# Patient Record
Sex: FEMALE | Race: WHITE | NOT HISPANIC OR LATINO | Employment: OTHER | URBAN - METROPOLITAN AREA
[De-identification: names, ages, dates, MRNs, and addresses within clinical notes are randomized per-mention and may not be internally consistent; named-entity substitution may affect disease eponyms.]

---

## 2021-04-09 LAB — CRC RECOMMENDATION EXT: NORMAL

## 2022-12-06 ENCOUNTER — OFFICE VISIT (OUTPATIENT)
Dept: OBSTETRICS AND GYNECOLOGY | Facility: CLINIC | Age: 67
End: 2022-12-06
Payer: COMMERCIAL

## 2022-12-06 VITALS
DIASTOLIC BLOOD PRESSURE: 62 MMHG | WEIGHT: 193.81 LBS | SYSTOLIC BLOOD PRESSURE: 112 MMHG | BODY MASS INDEX: 32.25 KG/M2

## 2022-12-06 DIAGNOSIS — Z01.419 WELL WOMAN EXAM WITH ROUTINE GYNECOLOGICAL EXAM: Primary | ICD-10-CM

## 2022-12-06 PROCEDURE — 3008F BODY MASS INDEX DOCD: CPT | Mod: CPTII,S$GLB,, | Performed by: OBSTETRICS & GYNECOLOGY

## 2022-12-06 PROCEDURE — 88175 CYTOPATH C/V AUTO FLUID REDO: CPT | Performed by: OBSTETRICS & GYNECOLOGY

## 2022-12-06 PROCEDURE — 87088 URINE BACTERIA CULTURE: CPT | Performed by: OBSTETRICS & GYNECOLOGY

## 2022-12-06 PROCEDURE — 1160F PR REVIEW ALL MEDS BY PRESCRIBER/CLIN PHARMACIST DOCUMENTED: ICD-10-PCS | Mod: CPTII,S$GLB,, | Performed by: OBSTETRICS & GYNECOLOGY

## 2022-12-06 PROCEDURE — 1101F PT FALLS ASSESS-DOCD LE1/YR: CPT | Mod: CPTII,S$GLB,, | Performed by: OBSTETRICS & GYNECOLOGY

## 2022-12-06 PROCEDURE — 3288F PR FALLS RISK ASSESSMENT DOCUMENTED: ICD-10-PCS | Mod: CPTII,S$GLB,, | Performed by: OBSTETRICS & GYNECOLOGY

## 2022-12-06 PROCEDURE — 99387 PR PREVENTIVE VISIT,NEW,65 & OVER: ICD-10-PCS | Mod: S$GLB,,, | Performed by: OBSTETRICS & GYNECOLOGY

## 2022-12-06 PROCEDURE — 3288F FALL RISK ASSESSMENT DOCD: CPT | Mod: CPTII,S$GLB,, | Performed by: OBSTETRICS & GYNECOLOGY

## 2022-12-06 PROCEDURE — 3074F PR MOST RECENT SYSTOLIC BLOOD PRESSURE < 130 MM HG: ICD-10-PCS | Mod: CPTII,S$GLB,, | Performed by: OBSTETRICS & GYNECOLOGY

## 2022-12-06 PROCEDURE — 3078F PR MOST RECENT DIASTOLIC BLOOD PRESSURE < 80 MM HG: ICD-10-PCS | Mod: CPTII,S$GLB,, | Performed by: OBSTETRICS & GYNECOLOGY

## 2022-12-06 PROCEDURE — 1101F PR PT FALLS ASSESS DOC 0-1 FALLS W/OUT INJ PAST YR: ICD-10-PCS | Mod: CPTII,S$GLB,, | Performed by: OBSTETRICS & GYNECOLOGY

## 2022-12-06 PROCEDURE — 3008F PR BODY MASS INDEX (BMI) DOCUMENTED: ICD-10-PCS | Mod: CPTII,S$GLB,, | Performed by: OBSTETRICS & GYNECOLOGY

## 2022-12-06 PROCEDURE — 87086 URINE CULTURE/COLONY COUNT: CPT | Performed by: OBSTETRICS & GYNECOLOGY

## 2022-12-06 PROCEDURE — 3078F DIAST BP <80 MM HG: CPT | Mod: CPTII,S$GLB,, | Performed by: OBSTETRICS & GYNECOLOGY

## 2022-12-06 PROCEDURE — 81001 URINALYSIS AUTO W/SCOPE: CPT | Performed by: OBSTETRICS & GYNECOLOGY

## 2022-12-06 PROCEDURE — 1126F AMNT PAIN NOTED NONE PRSNT: CPT | Mod: CPTII,S$GLB,, | Performed by: OBSTETRICS & GYNECOLOGY

## 2022-12-06 PROCEDURE — 87077 CULTURE AEROBIC IDENTIFY: CPT | Performed by: OBSTETRICS & GYNECOLOGY

## 2022-12-06 PROCEDURE — 3074F SYST BP LT 130 MM HG: CPT | Mod: CPTII,S$GLB,, | Performed by: OBSTETRICS & GYNECOLOGY

## 2022-12-06 PROCEDURE — 99999 PR PBB SHADOW E&M-EST. PATIENT-LVL III: CPT | Mod: PBBFAC,,, | Performed by: OBSTETRICS & GYNECOLOGY

## 2022-12-06 PROCEDURE — 1160F RVW MEDS BY RX/DR IN RCRD: CPT | Mod: CPTII,S$GLB,, | Performed by: OBSTETRICS & GYNECOLOGY

## 2022-12-06 PROCEDURE — 87186 SC STD MICRODIL/AGAR DIL: CPT | Performed by: OBSTETRICS & GYNECOLOGY

## 2022-12-06 PROCEDURE — 4010F ACE/ARB THERAPY RXD/TAKEN: CPT | Mod: CPTII,S$GLB,, | Performed by: OBSTETRICS & GYNECOLOGY

## 2022-12-06 PROCEDURE — 99999 PR PBB SHADOW E&M-EST. PATIENT-LVL III: ICD-10-PCS | Mod: PBBFAC,,, | Performed by: OBSTETRICS & GYNECOLOGY

## 2022-12-06 PROCEDURE — 4010F PR ACE/ARB THEARPY RXD/TAKEN: ICD-10-PCS | Mod: CPTII,S$GLB,, | Performed by: OBSTETRICS & GYNECOLOGY

## 2022-12-06 PROCEDURE — 1159F PR MEDICATION LIST DOCUMENTED IN MEDICAL RECORD: ICD-10-PCS | Mod: CPTII,S$GLB,, | Performed by: OBSTETRICS & GYNECOLOGY

## 2022-12-06 PROCEDURE — 1159F MED LIST DOCD IN RCRD: CPT | Mod: CPTII,S$GLB,, | Performed by: OBSTETRICS & GYNECOLOGY

## 2022-12-06 PROCEDURE — 99387 INIT PM E/M NEW PAT 65+ YRS: CPT | Mod: S$GLB,,, | Performed by: OBSTETRICS & GYNECOLOGY

## 2022-12-06 PROCEDURE — 1126F PR PAIN SEVERITY QUANTIFIED, NO PAIN PRESENT: ICD-10-PCS | Mod: CPTII,S$GLB,, | Performed by: OBSTETRICS & GYNECOLOGY

## 2022-12-06 NOTE — PROGRESS NOTES
HPI:   67 y.o.   OB History          2    Para        Term                AB        Living             SAB        IAB        Ectopic        Multiple        Live Births                  No LMP recorded (lmp unknown). Patient has had an ablation.    Patient is  here for her annual gynecologic exam.  She has no complaints.     ROS:  GENERAL: No fever, chills, fatigability or weight loss.  SKIN: No rashes, itching or changes in color or texture of skin.  HEAD: No headaches or recent head trauma.  EYES: Visual acuity fine. No photophobia, ocular pain or diplopia.  EARS: Denies ear pain, discharge or vertigo.  NOSE: No loss of smell, no epistaxis or postnasal drip.  MOUTH & THROAT: No hoarseness or change in voice. No excessive gum bleeding.  NODES: Denies swollen glands.  CHEST: Denies DIXON, cyanosis, wheezing, cough and sputum production.  CARDIOVASCULAR: Denies chest pain, PND, orthopnea or reduced exercise tolerance.  ABDOMEN: Appetite fine. No weight loss. Denies diarrhea, abdominal pain, hematemesis or blood in stool.  URINARY: No flank pain, dysuria or hematuria.  PERIPHERAL VASCULAR: No claudication or cyanosis.  MUSCULOSKELETAL: No joint stiffness or swelling. Denies back pain.  NEUROLOGIC: No history of seizures, paralysis, alteration of gait or coordination.    PE:   /62   Wt 87.9 kg (193 lb 12.6 oz)   LMP  (LMP Unknown)   BMI 32.25 kg/m²   APPEARANCE: Well nourished, well developed, in no acute distress.  NECK: Neck symmetric without masses or thyromegaly.  BREASTS: Symmetrical, no skin changes or visible lesions. No palpable masses, nipple discharge or adenopathy bilaterally.  ABDOMEN: Flat. Soft. No tenderness or masses. No hepatosplenomegaly. No hernias. No CVA tenderness.  VULVA: No lesions. Normal female genitalia.  URETHRAL MEATUS: Normal size and location, no lesions, no prolapse.  URETHRA: No masses, tenderness, prolapse or scarring.  VAGINA: Moist and well rugated, no discharge,  no significant cystocele or rectocele.  CERVIX: No lesions and discharge. PAP done.  UTERUS: Normal size, regular shape, mobile, non-tender, bladder base nontender.  ADNEXA: No masses, tenderness or CDS nodularity.  ANUS PERINEUM: Normal.    PROCEDURES:  Pap smear    Assessment:  Normal Gynecologic Exam    Plan:  Mammogram and Colonoscopy if indicated by current recommendations.  Return to clinic in one year or for any problems or complaints.  No gyn co, no bleeding  Does have passive urine loss upon rising, not at night, no usi  She looses it, then can go again, good stream,  Will send urine culture, then maybe try  meds trial

## 2022-12-07 LAB
BACTERIA #/AREA URNS AUTO: ABNORMAL /HPF
BILIRUB UR QL STRIP: NEGATIVE
CLARITY UR REFRACT.AUTO: CLEAR
COLOR UR AUTO: YELLOW
GLUCOSE UR QL STRIP: ABNORMAL
HGB UR QL STRIP: NEGATIVE
KETONES UR QL STRIP: NEGATIVE
LEUKOCYTE ESTERASE UR QL STRIP: NEGATIVE
MICROSCOPIC COMMENT: ABNORMAL
NITRITE UR QL STRIP: NEGATIVE
NON-SQ EPI CELLS #/AREA URNS AUTO: <1 /HPF
PH UR STRIP: 5 [PH] (ref 5–8)
PROT UR QL STRIP: NEGATIVE
RBC #/AREA URNS AUTO: 1 /HPF (ref 0–4)
SP GR UR STRIP: 1.03 (ref 1–1.03)
SQUAMOUS #/AREA URNS AUTO: 1 /HPF
URN SPEC COLLECT METH UR: ABNORMAL
WBC #/AREA URNS AUTO: 2 /HPF (ref 0–5)
YEAST UR QL AUTO: ABNORMAL

## 2022-12-09 ENCOUNTER — PATIENT MESSAGE (OUTPATIENT)
Dept: OBSTETRICS AND GYNECOLOGY | Facility: CLINIC | Age: 67
End: 2022-12-09
Payer: COMMERCIAL

## 2022-12-09 LAB — BACTERIA UR CULT: ABNORMAL

## 2022-12-09 RX ORDER — OXYBUTYNIN CHLORIDE 5 MG/1
5 TABLET ORAL 2 TIMES DAILY
Qty: 40 TABLET | Refills: 2 | Status: SHIPPED | OUTPATIENT
Start: 2022-12-09 | End: 2023-02-07

## 2022-12-14 LAB
FINAL PATHOLOGIC DIAGNOSIS: NORMAL
Lab: NORMAL

## 2023-02-28 ENCOUNTER — TELEPHONE (OUTPATIENT)
Dept: OBSTETRICS AND GYNECOLOGY | Facility: CLINIC | Age: 68
End: 2023-02-28
Payer: COMMERCIAL

## 2023-02-28 NOTE — TELEPHONE ENCOUNTER
----- Message from Jaskaran Hernandez sent at 2/28/2023  3:54 PM CST -----  Contact: pt  Type: Requesting to speak with nurse        Who Called: PT  Regarding: would like to speak to Hoda Montgomery MA  Would the patient rather a call back or a response via [facility]? Call back  Best Call Back Number: [phone]  Additional Information:      
----- Message from Krish Chapa sent at 2/28/2023  1:40 PM CST -----  .Type:  Needs Medical Advice    Who Called: pt    Would the patient rather a call back or a response via [facility]? Call back  Best Call Back Number: [phone]  Additional Information:     Pt stated medication for bladder leaks not working and pt saw an advertisement on TV about a new medication and wants to try it to please give her a call back    
Medication pt is currently taking is not helping.  Pt does not know the name of the medication she saw on tv, thinks it starts with an E.  Pt wants to know if there is another medication she can try or does she need to start thinking about bladder surgery  
Name of the medication is gemtesa  
no

## 2023-03-01 ENCOUNTER — PATIENT MESSAGE (OUTPATIENT)
Dept: OBSTETRICS AND GYNECOLOGY | Facility: CLINIC | Age: 68
End: 2023-03-01
Payer: COMMERCIAL

## 2023-03-02 ENCOUNTER — PATIENT MESSAGE (OUTPATIENT)
Dept: OBSTETRICS AND GYNECOLOGY | Facility: CLINIC | Age: 68
End: 2023-03-02
Payer: COMMERCIAL

## 2024-06-18 ENCOUNTER — HOSPITAL ENCOUNTER (EMERGENCY)
Facility: HOSPITAL | Age: 69
Discharge: PSYCHIATRIC OR SUBSTANCE USE FACILITY | End: 2024-06-19
Attending: EMERGENCY MEDICINE
Payer: MEDICARE

## 2024-06-18 DIAGNOSIS — R45.851 SUICIDAL IDEATION: Primary | ICD-10-CM

## 2024-06-18 DIAGNOSIS — Z00.8 MEDICAL CLEARANCE FOR PSYCHIATRIC ADMISSION: ICD-10-CM

## 2024-06-18 DIAGNOSIS — N39.0 URINARY TRACT INFECTION WITHOUT HEMATURIA, SITE UNSPECIFIED: ICD-10-CM

## 2024-06-18 LAB
ALBUMIN SERPL BCP-MCNC: 4.5 G/DL (ref 3.5–5.2)
ALP SERPL-CCNC: 53 U/L (ref 55–135)
ALT SERPL W/O P-5'-P-CCNC: 19 U/L (ref 10–44)
AMPHET+METHAMPHET UR QL: NEGATIVE
ANION GAP SERPL CALC-SCNC: 12 MMOL/L (ref 8–16)
APAP SERPL-MCNC: <3 UG/ML (ref 10–20)
AST SERPL-CCNC: 29 U/L (ref 10–40)
BACTERIA #/AREA URNS AUTO: ABNORMAL /HPF
BARBITURATES UR QL SCN>200 NG/ML: NEGATIVE
BASOPHILS # BLD AUTO: 0.05 K/UL (ref 0–0.2)
BASOPHILS NFR BLD: 0.5 % (ref 0–1.9)
BENZODIAZ UR QL SCN>200 NG/ML: NEGATIVE
BILIRUB SERPL-MCNC: 0.7 MG/DL (ref 0.1–1)
BILIRUB UR QL STRIP: NEGATIVE
BUN SERPL-MCNC: 10 MG/DL (ref 8–23)
BZE UR QL SCN: NEGATIVE
CALCIUM SERPL-MCNC: 10.4 MG/DL (ref 8.7–10.5)
CANNABINOIDS UR QL SCN: NEGATIVE
CHLORIDE SERPL-SCNC: 100 MMOL/L (ref 95–110)
CLARITY UR REFRACT.AUTO: ABNORMAL
CO2 SERPL-SCNC: 26 MMOL/L (ref 23–29)
COLOR UR AUTO: YELLOW
CREAT SERPL-MCNC: 0.9 MG/DL (ref 0.5–1.4)
CREAT UR-MCNC: 151 MG/DL (ref 15–325)
DIFFERENTIAL METHOD BLD: ABNORMAL
EOSINOPHIL # BLD AUTO: 0.6 K/UL (ref 0–0.5)
EOSINOPHIL NFR BLD: 6.3 % (ref 0–8)
ERYTHROCYTE [DISTWIDTH] IN BLOOD BY AUTOMATED COUNT: 14.3 % (ref 11.5–14.5)
EST. GFR  (NO RACE VARIABLE): >60 ML/MIN/1.73 M^2
ETHANOL SERPL-MCNC: <10 MG/DL
GLUCOSE SERPL-MCNC: 182 MG/DL (ref 70–110)
GLUCOSE UR QL STRIP: ABNORMAL
HCT VFR BLD AUTO: 47.4 % (ref 37–48.5)
HCV AB SERPL QL IA: NORMAL
HGB BLD-MCNC: 16.5 G/DL (ref 12–16)
HGB UR QL STRIP: NEGATIVE
HIV 1+2 AB+HIV1 P24 AG SERPL QL IA: NORMAL
HYALINE CASTS UR QL AUTO: 40 /LPF
IMM GRANULOCYTES # BLD AUTO: 0.03 K/UL (ref 0–0.04)
IMM GRANULOCYTES NFR BLD AUTO: 0.3 % (ref 0–0.5)
KETONES UR QL STRIP: NEGATIVE
LEUKOCYTE ESTERASE UR QL STRIP: ABNORMAL
LYMPHOCYTES # BLD AUTO: 3.2 K/UL (ref 1–4.8)
LYMPHOCYTES NFR BLD: 33.5 % (ref 18–48)
MCH RBC QN AUTO: 29.1 PG (ref 27–31)
MCHC RBC AUTO-ENTMCNC: 34.8 G/DL (ref 32–36)
MCV RBC AUTO: 84 FL (ref 82–98)
METHADONE UR QL SCN>300 NG/ML: NEGATIVE
MICROSCOPIC COMMENT: ABNORMAL
MONOCYTES # BLD AUTO: 0.8 K/UL (ref 0.3–1)
MONOCYTES NFR BLD: 8.2 % (ref 4–15)
NEUTROPHILS # BLD AUTO: 4.9 K/UL (ref 1.8–7.7)
NEUTROPHILS NFR BLD: 51.2 % (ref 38–73)
NITRITE UR QL STRIP: NEGATIVE
NRBC BLD-RTO: 0 /100 WBC
OPIATES UR QL SCN: NEGATIVE
PCP UR QL SCN>25 NG/ML: NEGATIVE
PH UR STRIP: 6 [PH] (ref 5–8)
PLATELET # BLD AUTO: 155 K/UL (ref 150–450)
PMV BLD AUTO: 10 FL (ref 9.2–12.9)
POTASSIUM SERPL-SCNC: 3.8 MMOL/L (ref 3.5–5.1)
PROT SERPL-MCNC: 8.7 G/DL (ref 6–8.4)
PROT UR QL STRIP: ABNORMAL
RBC # BLD AUTO: 5.67 M/UL (ref 4–5.4)
RBC #/AREA URNS AUTO: 40 /HPF (ref 0–4)
SODIUM SERPL-SCNC: 138 MMOL/L (ref 136–145)
SP GR UR STRIP: >1.03 (ref 1–1.03)
SQUAMOUS #/AREA URNS AUTO: 1 /HPF
TOXICOLOGY INFORMATION: NORMAL
TSH SERPL DL<=0.005 MIU/L-ACNC: 2.91 UIU/ML (ref 0.4–4)
URN SPEC COLLECT METH UR: ABNORMAL
WBC # BLD AUTO: 9.54 K/UL (ref 3.9–12.7)
WBC #/AREA URNS AUTO: 11 /HPF (ref 0–5)
YEAST UR QL AUTO: ABNORMAL

## 2024-06-18 PROCEDURE — 99285 EMERGENCY DEPT VISIT HI MDM: CPT | Mod: 25

## 2024-06-18 PROCEDURE — 82077 ASSAY SPEC XCP UR&BREATH IA: CPT | Performed by: EMERGENCY MEDICINE

## 2024-06-18 PROCEDURE — 93005 ELECTROCARDIOGRAM TRACING: CPT

## 2024-06-18 PROCEDURE — 87389 HIV-1 AG W/HIV-1&-2 AB AG IA: CPT | Performed by: PHYSICIAN ASSISTANT

## 2024-06-18 PROCEDURE — 80053 COMPREHEN METABOLIC PANEL: CPT | Performed by: EMERGENCY MEDICINE

## 2024-06-18 PROCEDURE — 86803 HEPATITIS C AB TEST: CPT | Performed by: PHYSICIAN ASSISTANT

## 2024-06-18 PROCEDURE — 93010 ELECTROCARDIOGRAM REPORT: CPT | Mod: ,,, | Performed by: INTERNAL MEDICINE

## 2024-06-18 PROCEDURE — 80307 DRUG TEST PRSMV CHEM ANLYZR: CPT | Performed by: EMERGENCY MEDICINE

## 2024-06-18 PROCEDURE — 80143 DRUG ASSAY ACETAMINOPHEN: CPT | Performed by: EMERGENCY MEDICINE

## 2024-06-18 PROCEDURE — 84443 ASSAY THYROID STIM HORMONE: CPT | Performed by: EMERGENCY MEDICINE

## 2024-06-18 PROCEDURE — 87086 URINE CULTURE/COLONY COUNT: CPT | Performed by: EMERGENCY MEDICINE

## 2024-06-18 PROCEDURE — 85025 COMPLETE CBC W/AUTO DIFF WBC: CPT | Performed by: EMERGENCY MEDICINE

## 2024-06-18 PROCEDURE — 81001 URINALYSIS AUTO W/SCOPE: CPT | Mod: XB | Performed by: EMERGENCY MEDICINE

## 2024-06-18 PROCEDURE — 25000003 PHARM REV CODE 250: Performed by: STUDENT IN AN ORGANIZED HEALTH CARE EDUCATION/TRAINING PROGRAM

## 2024-06-18 RX ORDER — GLUCAGON 1 MG
1 KIT INJECTION
Status: DISCONTINUED | OUTPATIENT
Start: 2024-06-18 | End: 2024-06-19 | Stop reason: HOSPADM

## 2024-06-18 RX ORDER — FLUCONAZOLE 200 MG/1
200 TABLET ORAL
Status: COMPLETED | OUTPATIENT
Start: 2024-06-18 | End: 2024-06-18

## 2024-06-18 RX ORDER — CEPHALEXIN 500 MG/1
500 CAPSULE ORAL
Status: COMPLETED | OUTPATIENT
Start: 2024-06-18 | End: 2024-06-18

## 2024-06-18 RX ORDER — INSULIN ASPART 100 [IU]/ML
0-5 INJECTION, SOLUTION INTRAVENOUS; SUBCUTANEOUS
Status: DISCONTINUED | OUTPATIENT
Start: 2024-06-18 | End: 2024-06-19 | Stop reason: HOSPADM

## 2024-06-18 RX ORDER — IBUPROFEN 200 MG
24 TABLET ORAL
Status: DISCONTINUED | OUTPATIENT
Start: 2024-06-18 | End: 2024-06-19 | Stop reason: HOSPADM

## 2024-06-18 RX ORDER — SERTRALINE HYDROCHLORIDE 50 MG/1
100 TABLET, FILM COATED ORAL DAILY
Status: DISCONTINUED | OUTPATIENT
Start: 2024-06-19 | End: 2024-06-19 | Stop reason: HOSPADM

## 2024-06-18 RX ORDER — LOSARTAN POTASSIUM 25 MG/1
25 TABLET ORAL ONCE
Status: COMPLETED | OUTPATIENT
Start: 2024-06-18 | End: 2024-06-18

## 2024-06-18 RX ORDER — IBUPROFEN 200 MG
16 TABLET ORAL
Status: DISCONTINUED | OUTPATIENT
Start: 2024-06-18 | End: 2024-06-19 | Stop reason: HOSPADM

## 2024-06-18 RX ORDER — METOPROLOL SUCCINATE 50 MG/1
50 TABLET, EXTENDED RELEASE ORAL DAILY
Status: DISCONTINUED | OUTPATIENT
Start: 2024-06-19 | End: 2024-06-19 | Stop reason: HOSPADM

## 2024-06-18 RX ORDER — CEPHALEXIN 500 MG/1
500 CAPSULE ORAL EVERY 8 HOURS
Qty: 15 CAPSULE | Refills: 0 | Status: SHIPPED | OUTPATIENT
Start: 2024-06-18 | End: 2024-06-23

## 2024-06-18 RX ORDER — LISINOPRIL 10 MG/1
10 TABLET ORAL
Status: DISCONTINUED | OUTPATIENT
Start: 2024-06-18 | End: 2024-06-18

## 2024-06-18 RX ADMIN — CEPHALEXIN 500 MG: 500 CAPSULE ORAL at 11:06

## 2024-06-18 RX ADMIN — LOSARTAN POTASSIUM 25 MG: 25 TABLET, FILM COATED ORAL at 11:06

## 2024-06-18 RX ADMIN — FLUCONAZOLE 200 MG: 200 TABLET ORAL at 11:06

## 2024-06-19 VITALS
DIASTOLIC BLOOD PRESSURE: 90 MMHG | SYSTOLIC BLOOD PRESSURE: 155 MMHG | BODY MASS INDEX: 30.32 KG/M2 | WEIGHT: 182 LBS | HEART RATE: 92 BPM | RESPIRATION RATE: 18 BRPM | HEIGHT: 65 IN | TEMPERATURE: 98 F | OXYGEN SATURATION: 98 %

## 2024-06-19 LAB
OHS QRS DURATION: 84 MS
OHS QTC CALCULATION: 438 MS
POCT GLUCOSE: 137 MG/DL (ref 70–110)

## 2024-06-19 PROCEDURE — 82962 GLUCOSE BLOOD TEST: CPT

## 2024-06-19 NOTE — ED TRIAGE NOTES
Chloe Dolan, a 68 y.o. female presents to the ED w/ complaint of psych eval. OPC. Reports suicidal ideations with a plan to overdose. Not taking care of herself.     Triage note:  Chief Complaint   Patient presents with    Psychiatric Evaluation     OPC. Reports suicidal ideations with a plan to overdose. Not taking care of herself.      Review of patient's allergies indicates:   Allergen Reactions    Adhesive Itching     Past Medical History:   Diagnosis Date    Diabetes mellitus     Heart disease     Hyperlipidemia     Hypertension

## 2024-06-19 NOTE — ED PROVIDER NOTES
Assumed  care of this patient pending urinalysis.  She  is a 68-year-old female who  recently went through a divorce, and has been threatening to overdose on her medication.  Pec placed by prior physician, Dr. Roper, and psychiatric clearance labs sent.  Labs reviewed, largely within normal limits.  UA with questionable UTI, will treat with Keflex.  Patient is medically cleared for psychiatric placement.     Mo Moore MD  06/18/24 6479

## 2024-06-19 NOTE — ED PROVIDER NOTES
Encounter Date: 6/18/2024       History     Chief Complaint   Patient presents with    Psychiatric Evaluation     OPC. Reports suicidal ideations with a plan to overdose. Not taking care of herself.      68-year-old female with history of coronary artery disease, diabetes, hyperlipidemia, hypertension presents under an OPC for suicidal expression.  The OPC states that she has not been bathing, has been giving money away to an actor, express suicidal intent for overdose.  The patient states that she did tell her daughter that she was going to overdose on her medications.  The patient states that she has enough medications including amitriptyline at home.  However, the patient states that she did not mean it.  She does note that her mother had an overdose.  Her social situation has been challenge.  She has no longer house since she got divorce from her .  She is living with her daughter's mother-in-law.  She states that she has difficulty bathing at times due to her arthritis.  She also gets urinary incontinence.        Review of patient's allergies indicates:   Allergen Reactions    Adhesive Itching     Past Medical History:   Diagnosis Date    Diabetes mellitus     Heart disease     Hyperlipidemia     Hypertension      Past Surgical History:   Procedure Laterality Date    ARTERIAL BYPASS SURGRY      back surgey      CHOLECYSTECTOMY      TOTAL KNEE ARTHROPLASTY      WISDOM TOOTH EXTRACTION       Family History   Problem Relation Name Age of Onset    Breast cancer Cousin       Social History     Tobacco Use    Smoking status: Never   Substance Use Topics    Alcohol use: No    Drug use: No     Review of Systems    Physical Exam     Initial Vitals [06/18/24 1846]   BP Pulse Resp Temp SpO2   (!) 144/70 96 18 98 °F (36.7 °C) 97 %      MAP       --         Physical Exam    Constitutional: She is diaphoretic. She appears distressed.   HENT:   Head: Normocephalic and atraumatic.   Eyes: Pupils are equal, round, and  reactive to light.   Neck:   Normal range of motion.  Cardiovascular:  Normal rate, regular rhythm and normal heart sounds.           Pulmonary/Chest: Breath sounds normal. No respiratory distress. She has no wheezes. She has no rales.   Musculoskeletal:      Cervical back: Normal range of motion.     Neurological: She is alert. She has normal strength.   Right face droop from Bell's palsy which is chronic   Psychiatric:   Patient seems upset and desperate.  She denies being suicidal but then states that she has enough medications to kill herself.         ED Course   Procedures  Labs Reviewed   CBC W/ AUTO DIFFERENTIAL - Abnormal; Notable for the following components:       Result Value    RBC 5.67 (*)     Hemoglobin 16.5 (*)     Eos # 0.6 (*)     All other components within normal limits    Narrative:     Release to patient->Immediate   COMPREHENSIVE METABOLIC PANEL - Abnormal; Notable for the following components:    Glucose 182 (*)     Total Protein 8.7 (*)     Alkaline Phosphatase 53 (*)     All other components within normal limits    Narrative:     Release to patient->Immediate   ACETAMINOPHEN LEVEL - Abnormal; Notable for the following components:    Acetaminophen (Tylenol), Serum <3.0 (*)     All other components within normal limits    Narrative:     Release to patient->Immediate   HIV 1 / 2 ANTIBODY    Narrative:     Release to patient->Immediate   HEPATITIS C ANTIBODY    Narrative:     Release to patient->Immediate   TSH    Narrative:     Release to patient->Immediate   DRUG SCREEN PANEL, URINE EMERGENCY    Narrative:     Specimen Source->Urine   ALCOHOL,MEDICAL (ETHANOL)    Narrative:     Release to patient->Immediate   URINALYSIS, REFLEX TO URINE CULTURE     EKG Readings: (Independently Interpreted)   Normal sinus rhythm without acute ischemic changes       Imaging Results    None          Medications   metoprolol succinate (TOPROL-XL) 24 hr tablet 50 mg (has no administration in time range)    lisinopriL tablet 10 mg (has no administration in time range)   sertraline tablet 100 mg (has no administration in time range)   glucose chewable tablet 16 g (has no administration in time range)   glucose chewable tablet 24 g (has no administration in time range)   glucagon (human recombinant) injection 1 mg (has no administration in time range)   insulin aspart U-100 pen 0-5 Units (has no administration in time range)   dextrose 10% bolus 125 mL 125 mL (has no administration in time range)   dextrose 10% bolus 250 mL 250 mL (has no administration in time range)     Medical Decision Making  I reviewed the OPC.  Patient refused to let me call her daughter.  I am very concerned about this situation.  She express suicidal intent earlier but then now states she was choking.  It seems strange that she brings up several times that she has enough medications at home to kill herself.  I am concerned that her mother had an overdose.  It seems that her social situation is poor and she has had a lot of negative changes in her life recently.  Will continue the OPC and place a pec.    Amount and/or Complexity of Data Reviewed  Labs: ordered.    Risk  OTC drugs.  Prescription drug management.                                      Clinical Impression:  Final diagnoses:  [Z00.8] Medical clearance for psychiatric admission  [R45.527] Suicidal ideation (Primary)                 Woody Roper MD  06/18/24 2157       Woody Roper MD  06/18/24 1849

## 2024-06-19 NOTE — ED NOTES
Mark here to receive pt. [facility], and security. Patient belongings, secure envelope, face sheet, and original transfer form and PEC/OPC sent with patient to facility. Pt left in NAD.

## 2024-06-20 LAB
BACTERIA UR CULT: NORMAL
BACTERIA UR CULT: NORMAL

## 2024-06-29 ENCOUNTER — HOSPITAL ENCOUNTER (EMERGENCY)
Facility: HOSPITAL | Age: 69
Discharge: HOME | End: 2024-06-29
Attending: EMERGENCY MEDICINE
Payer: MEDICARE

## 2024-06-29 VITALS
RESPIRATION RATE: 18 BRPM | TEMPERATURE: 99 F | BODY MASS INDEX: 30.32 KG/M2 | HEIGHT: 65 IN | SYSTOLIC BLOOD PRESSURE: 182 MMHG | WEIGHT: 182 LBS | HEART RATE: 94 BPM | DIASTOLIC BLOOD PRESSURE: 84 MMHG | OXYGEN SATURATION: 97 %

## 2024-06-29 DIAGNOSIS — R60.0 PERIPHERAL EDEMA: ICD-10-CM

## 2024-06-29 DIAGNOSIS — F91.9 BEHAVIOR DISTURBANCE: Primary | ICD-10-CM

## 2024-06-29 LAB
ALBUMIN SERPL BCP-MCNC: 4.2 G/DL (ref 3.5–5.2)
ALP SERPL-CCNC: 52 U/L (ref 55–135)
ALT SERPL W/O P-5'-P-CCNC: 14 U/L (ref 10–44)
AMPHET+METHAMPHET UR QL: NEGATIVE
ANION GAP SERPL CALC-SCNC: 18 MMOL/L (ref 8–16)
APAP SERPL-MCNC: <3 UG/ML (ref 10–20)
AST SERPL-CCNC: 22 U/L (ref 10–40)
BACTERIA #/AREA URNS HPF: NORMAL /HPF
BARBITURATES UR QL SCN>200 NG/ML: NEGATIVE
BASOPHILS # BLD AUTO: 0.02 K/UL (ref 0–0.2)
BASOPHILS NFR BLD: 0.2 % (ref 0–1.9)
BENZODIAZ UR QL SCN>200 NG/ML: NEGATIVE
BILIRUB SERPL-MCNC: 0.7 MG/DL (ref 0.1–1)
BILIRUB UR QL STRIP: NEGATIVE
BNP SERPL-MCNC: 114 PG/ML (ref 0–99)
BUN SERPL-MCNC: 10 MG/DL (ref 8–23)
BZE UR QL SCN: NEGATIVE
CALCIUM SERPL-MCNC: 10.3 MG/DL (ref 8.7–10.5)
CANNABINOIDS UR QL SCN: NEGATIVE
CHLORIDE SERPL-SCNC: 103 MMOL/L (ref 95–110)
CLARITY UR: CLEAR
CO2 SERPL-SCNC: 23 MMOL/L (ref 23–29)
COLOR UR: YELLOW
CREAT SERPL-MCNC: 0.9 MG/DL (ref 0.5–1.4)
CREAT UR-MCNC: 22.9 MG/DL (ref 15–325)
DIFFERENTIAL METHOD BLD: ABNORMAL
EOSINOPHIL # BLD AUTO: 0.5 K/UL (ref 0–0.5)
EOSINOPHIL NFR BLD: 5.2 % (ref 0–8)
ERYTHROCYTE [DISTWIDTH] IN BLOOD BY AUTOMATED COUNT: 14.4 % (ref 11.5–14.5)
EST. GFR  (NO RACE VARIABLE): >60 ML/MIN/1.73 M^2
ETHANOL SERPL-MCNC: <10 MG/DL
GLUCOSE SERPL-MCNC: 138 MG/DL (ref 70–110)
GLUCOSE UR QL STRIP: ABNORMAL
HCT VFR BLD AUTO: 41.9 % (ref 37–48.5)
HGB BLD-MCNC: 14.1 G/DL (ref 12–16)
HGB UR QL STRIP: NEGATIVE
IMM GRANULOCYTES # BLD AUTO: 0.02 K/UL (ref 0–0.04)
IMM GRANULOCYTES NFR BLD AUTO: 0.2 % (ref 0–0.5)
KETONES UR QL STRIP: NEGATIVE
LEUKOCYTE ESTERASE UR QL STRIP: NEGATIVE
LYMPHOCYTES # BLD AUTO: 2.8 K/UL (ref 1–4.8)
LYMPHOCYTES NFR BLD: 28.9 % (ref 18–48)
MCH RBC QN AUTO: 28.8 PG (ref 27–31)
MCHC RBC AUTO-ENTMCNC: 33.7 G/DL (ref 32–36)
MCV RBC AUTO: 86 FL (ref 82–98)
METHADONE UR QL SCN>300 NG/ML: NEGATIVE
MICROSCOPIC COMMENT: NORMAL
MONOCYTES # BLD AUTO: 0.9 K/UL (ref 0.3–1)
MONOCYTES NFR BLD: 9.9 % (ref 4–15)
NEUTROPHILS # BLD AUTO: 5.3 K/UL (ref 1.8–7.7)
NEUTROPHILS NFR BLD: 55.6 % (ref 38–73)
NITRITE UR QL STRIP: NEGATIVE
NRBC BLD-RTO: 0 /100 WBC
OPIATES UR QL SCN: NEGATIVE
PCP UR QL SCN>25 NG/ML: NEGATIVE
PH UR STRIP: 5 [PH] (ref 5–8)
PLATELET # BLD AUTO: 149 K/UL (ref 150–450)
PMV BLD AUTO: 10 FL (ref 9.2–12.9)
POCT GLUCOSE: 125 MG/DL (ref 70–110)
POTASSIUM SERPL-SCNC: 3.5 MMOL/L (ref 3.5–5.1)
PROT SERPL-MCNC: 8.1 G/DL (ref 6–8.4)
PROT UR QL STRIP: NEGATIVE
RBC # BLD AUTO: 4.89 M/UL (ref 4–5.4)
SALICYLATES SERPL-MCNC: <5 MG/DL (ref 15–30)
SODIUM SERPL-SCNC: 144 MMOL/L (ref 136–145)
SP GR UR STRIP: 1.01 (ref 1–1.03)
SQUAMOUS #/AREA URNS HPF: 2 /HPF
TOXICOLOGY INFORMATION: NORMAL
TSH SERPL DL<=0.005 MIU/L-ACNC: 3 UIU/ML (ref 0.4–4)
URN SPEC COLLECT METH UR: ABNORMAL
UROBILINOGEN UR STRIP-ACNC: NEGATIVE EU/DL
WBC # BLD AUTO: 9.54 K/UL (ref 3.9–12.7)
WBC #/AREA URNS HPF: 1 /HPF (ref 0–5)
YEAST URNS QL MICRO: NORMAL

## 2024-06-29 PROCEDURE — 81000 URINALYSIS NONAUTO W/SCOPE: CPT | Performed by: EMERGENCY MEDICINE

## 2024-06-29 PROCEDURE — 80053 COMPREHEN METABOLIC PANEL: CPT | Performed by: EMERGENCY MEDICINE

## 2024-06-29 PROCEDURE — 83880 ASSAY OF NATRIURETIC PEPTIDE: CPT | Performed by: EMERGENCY MEDICINE

## 2024-06-29 PROCEDURE — 80143 DRUG ASSAY ACETAMINOPHEN: CPT | Performed by: EMERGENCY MEDICINE

## 2024-06-29 PROCEDURE — 82077 ASSAY SPEC XCP UR&BREATH IA: CPT | Performed by: EMERGENCY MEDICINE

## 2024-06-29 PROCEDURE — 25000003 PHARM REV CODE 250: Performed by: EMERGENCY MEDICINE

## 2024-06-29 PROCEDURE — 99283 EMERGENCY DEPT VISIT LOW MDM: CPT

## 2024-06-29 PROCEDURE — G0427 INPT/ED TELECONSULT70: HCPCS | Mod: 95,,, | Performed by: PSYCHIATRY & NEUROLOGY

## 2024-06-29 PROCEDURE — 84443 ASSAY THYROID STIM HORMONE: CPT | Performed by: EMERGENCY MEDICINE

## 2024-06-29 PROCEDURE — 80179 DRUG ASSAY SALICYLATE: CPT | Performed by: EMERGENCY MEDICINE

## 2024-06-29 PROCEDURE — 82962 GLUCOSE BLOOD TEST: CPT

## 2024-06-29 PROCEDURE — 80307 DRUG TEST PRSMV CHEM ANLYZR: CPT | Performed by: EMERGENCY MEDICINE

## 2024-06-29 PROCEDURE — 85025 COMPLETE CBC W/AUTO DIFF WBC: CPT | Performed by: EMERGENCY MEDICINE

## 2024-06-29 PROCEDURE — 36415 COLL VENOUS BLD VENIPUNCTURE: CPT | Performed by: EMERGENCY MEDICINE

## 2024-06-29 RX ORDER — FUROSEMIDE 20 MG/1
20 TABLET ORAL
Status: COMPLETED | OUTPATIENT
Start: 2024-06-29 | End: 2024-06-29

## 2024-06-29 RX ADMIN — FUROSEMIDE 20 MG: 20 TABLET ORAL at 08:06

## 2024-06-30 NOTE — ED PROVIDER NOTES
Encounter Date: 6/29/2024       History     Chief Complaint   Patient presents with    Psychiatric Evaluation     Recent discharge from [facility]     Emergent evaluation of a 68-year-old female with history of CAD status post CABG, hypertension, hyperlipidemia, diabetes, CHF on 20 mg of Lasix daily presents to the ER by EMS after discharge from [facility] today.  Patient has been at [facility] for a verbal argument and stating that she could overdose on her meds- but did not have an attempt. She has been depressed over a recent divorce and difficult social situation of no longer having her own home.    At this time she is very linear,  alert awake and oriented. she knows all of her medications, past medical history, physicians and procedures and dates.  She reports while in [facility] they did not have urinary pads that she has to use for urinary incontinence and was having to wear pull-ups.  This caused her to get a rash to the right groin.  That when she came home today she did cleaned the area but she has not yet ap[plied antifungal ointment on it but did placed baby powder in the area.  Since arriving home she also placed her compression stocking on and took diuretic which caused increased urination. she reports that while at the facility she was not given her Lasix timely and developed swelling in the legs as well as dyspnea on exertion when walking down the hallways.  She reports no chest pain or shortness of breath no cough now.  She was states when she got home today she and her granddaughter got into an argument over the possibility that is she is being scammed for money. She feels she knows the man dennise is giving money too but understands that it may be a scam and states she knows she should stopped giving him money..  But this resulted in argument in which she became angry and said to her granddaughter that if she had a stroke or heart attack she would just lay on the floor.  She  reports that she regrets saying this and does not feel suicidal.  She also reports no homicidal ideations.  She wants to go back to her granddaughter's house due to not having anywhere us to stay.  She was unsure whether her granddaughter will allow this        I spoke with Keisha Bahena, pts granddaughter, whom after leaving [facility] she went home with today.  She reports they got into an argument over the fact that Ms. Corona he was being scammed out of money.  The patient reportedly said she if she had  stroke or heart attack symptoms that she was just lay in the floor and did not want to get treatment.  The grand daughter brings this up as possible suicidality, she also reports the patient urinated on herself 3 times today and did not want to immediately clean herself.   And reportedly hid her right groin rash from the psychiatric facility.  She reports she does not feel comfortable taking the patient back to her home due to these above incidents.  She was concerned that she may become suicidal and attempt to hurt herself though she had not  say that she wanted to hurt herself, had a plan or attempt. No homicidal statements were made.   Family had reportedly been in the ER when patient was 1st arrived but within 1 hr 20 minutes left- but were available for phone call.      Review of patient's allergies indicates:   Allergen Reactions    Adhesive Itching     Past Medical History:   Diagnosis Date    Diabetes mellitus     Heart disease     Hyperlipidemia     Hypertension      Past Surgical History:   Procedure Laterality Date    ARTERIAL BYPASS SURGRY      back surgey      CHOLECYSTECTOMY      TOTAL KNEE ARTHROPLASTY      WISDOM TOOTH EXTRACTION       Family History   Problem Relation Name Age of Onset    Breast cancer Cousin       Social History     Tobacco Use    Smoking status: Never   Substance Use Topics    Alcohol use: No    Drug use: No     Review of Systems   Constitutional:  Negative for  activity change, appetite change, chills, diaphoresis, fatigue and fever.   HENT:  Negative for congestion, postnasal drip, rhinorrhea and sore throat.    Respiratory:  Negative for cough, chest tightness, shortness of breath, wheezing and stridor.         Dyspnea on exertion   Cardiovascular:  Positive for leg swelling. Negative for chest pain and palpitations.   Gastrointestinal:  Negative for abdominal distention, abdominal pain, blood in stool, constipation, diarrhea, nausea and vomiting.   Genitourinary:  Positive for frequency. Negative for difficulty urinating, dysuria, flank pain, hematuria and urgency.   Musculoskeletal:  Negative for arthralgias, back pain, myalgias, neck pain and neck stiffness.   Skin:  Positive for rash. Negative for wound.   Neurological:  Negative for dizziness, syncope, weakness, light-headedness and headaches.   Hematological:  Does not bruise/bleed easily.   Psychiatric/Behavioral:  Positive for sleep disturbance. Negative for agitation, behavioral problems, confusion, decreased concentration, dysphoric mood, hallucinations, self-injury and suicidal ideas. The patient is not nervous/anxious and is not hyperactive.    All other systems reviewed and are negative.      Physical Exam     Initial Vitals [06/29/24 1808]   BP Pulse Resp Temp SpO2   (!) 157/77 95 18 98 °F (36.7 °C) 97 %      MAP       --         Physical Exam    Nursing note and vitals reviewed.  Constitutional: She appears well-developed and well-nourished. She is not diaphoretic. No distress.   HENT:   Head: Normocephalic and atraumatic.   Right Ear: External ear normal.   Left Ear: External ear normal.   Nose: Nose normal.   Mouth/Throat: Oropharynx is clear and moist.   Eyes: Conjunctivae and EOM are normal. Pupils are equal, round, and reactive to light.   Neck: Neck supple. No tracheal deviation present.   Normal range of motion.  Cardiovascular:  Normal rate, regular rhythm, normal heart sounds and intact distal  pulses.     Exam reveals no gallop and no friction rub.       No murmur heard.  Pulmonary/Chest: Breath sounds normal. No stridor. No respiratory distress. She has no wheezes. She has no rhonchi. She has no rales. She exhibits no tenderness.   Abdominal: Abdomen is soft. Bowel sounds are normal. She exhibits no distension and no mass. There is no abdominal tenderness. There is no rebound and no guarding.   Musculoskeletal:         General: No edema. Normal range of motion.      Cervical back: Normal range of motion and neck supple.     Neurological: She is alert and oriented to person, place, and time. She has normal strength. No cranial nerve deficit or sensory deficit.   Skin: Skin is warm and dry. No rash noted. No erythema. No pallor.   Psychiatric: She has a normal mood and affect. Her speech is normal and behavior is normal. Judgment and thought content normal. She is not actively hallucinating. Cognition and memory are not impaired. She is attentive.         ED Course   Procedures  Labs Reviewed   CBC W/ AUTO DIFFERENTIAL - Abnormal; Notable for the following components:       Result Value    Platelets 149 (*)     All other components within normal limits   COMPREHENSIVE METABOLIC PANEL - Abnormal; Notable for the following components:    Glucose 138 (*)     Alkaline Phosphatase 52 (*)     Anion Gap 18 (*)     All other components within normal limits   URINALYSIS, REFLEX TO URINE CULTURE - Abnormal; Notable for the following components:    Glucose, UA 4+ (*)     All other components within normal limits    Narrative:     Specimen Source->Urine   ACETAMINOPHEN LEVEL - Abnormal; Notable for the following components:    Acetaminophen (Tylenol), Serum <3.0 (*)     All other components within normal limits   SALICYLATE LEVEL - Abnormal; Notable for the following components:    Salicylate Lvl <5.0 (*)     All other components within normal limits   B-TYPE NATRIURETIC PEPTIDE - Abnormal; Notable for the following  components:     (*)     All other components within normal limits   POCT GLUCOSE - Abnormal; Notable for the following components:    POCT Glucose 125 (*)     All other components within normal limits   TSH   DRUG SCREEN PANEL, URINE EMERGENCY    Narrative:     Specimen Source->Urine   ALCOHOL,MEDICAL (ETHANOL)   URINALYSIS MICROSCOPIC    Narrative:     Specimen Source->Urine          Imaging Results    None          Medications   furosemide tablet 20 mg (20 mg Oral Given 6/29/24 2003)     Medical Decision Making  Emergent evaluation of a 68-year-old female with history of CAD status post CABG, hypertension, hyperlipidemia, diabetes, CHF on 20 mg of Lasix daily presents to the ER by EMS after discharge from [facility] today.  Patient had been admitted there on June 18th due to verbal threats that she might overdose.  She was discharged today and got into an argument with the granddaughter and again had verbal threats that if she had heart attack or stroke symptoms she was laid on the floor.  She had no suicidal ideations or homicidal ideations.  That has no physical altercation.  Granddaughter also was concerned that she had not immediately care if she had urinated on herself though the patient reports she was taking Lasix which caused her urinary frequency.  And the granddaughter was concerned that she was not treated the rash to her groin and possibly hit it from psychiatric staff.  On my exam patient was blood pressure is 147/77 pulse 95 temp 98° respirations 18 sats 97%.  She was awake alert oriented x3 has no signs of acute psychosis suicidality or homicidal ideation she was not delusional or hallucinating.  She states that she becomes upset and states things that she understands that she shouldn't.  Based on talking to her and on her exam it does appear that the right groin rash has been cleaned.  And has baby powder in place.  She was compression stockings on the lower extremities.  She was  normal cardiac and lung exam with clear breath sounds.  It appears that she has taken steps to manage her peripheral edema and the groin rash.      I do not feel that patient was gravely disabled at this time suicidal or homicidal.  Due to difficult social situation and no clear living arrangements if she was discharged from the ER I will have psychiatrist speak with the patient, and family to determine best course of action patient was under pec in till evaluated by psychiatry.  Patient was agreeable with the evaluation with lab work including adding BNP for possible volume overload and taking 20 mg a Lasix here to help with peripheral edema.  [facility]    Patient presents for emergent evaluation of acute evaluation for psychiatric placement that poses a threat to life and/or bodily function.   Differential diagnosis includes but was not limited to angry outburst, behavior disturbance, acute psychosis, depression, suicidal ideations, CHF, peripheral edema.   In the ED patient found to have acute behavior disturbance now resolved, peripheral edema   I ordered labs and personally reviewed them.  Labs significant for see below  I ordered X-rays and personally reviewed them and reviewed the radiologist interpretation.  Xray significant for see below.    I ordered EKG and personally reviewed it.  EKG significant for see above     [facility]     Patient was managed in the ED with Lasix 20 mg orally   The response to treatment was good.  I spoke with , psych both before and after her evaluation he reports that they have worked out between the granddaughter and the patient that she can go home with them but she was going to turn over her phone to them in order to the she was not continue sitting money to an unknown person, and dog granddaughter will distribute medicines.  She will be discharged home.   Pec rescinded   Rosalia Brooks MD      Amount and/or Complexity of Data Reviewed  Independent Historian:  caregiver  External Data Reviewed: notes.  Labs: ordered.    Risk  Prescription drug management.               ED Course as of 06/29/24 2233   Sat Jun 29, 2024 2112 I spoke with psychiatrist, he was now interviewing the patient and her granddaughter who is at the hospital.  Rosalia Brooks M.D.  9:13 PM 6/29/2024   [RM]   2230 Bnp 114  [RM]      ED Course User Index  [RM] Rosalia Brooks MD                           Clinical Impression:  Final diagnoses:  [F91.9] Behavior disturbance (Primary)  [R60.0] Peripheral edema          ED Disposition Condition    Discharge Stable          ED Prescriptions    None       Follow-up Information    None          Rosalia Brooks MD  06/29/24 2233

## 2024-06-30 NOTE — ED NOTES
Patient is resting in bed without any needs or questions at this time. Patient is calm and cooperative. Risk sitter is in the doorway with direct observation.      No
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
## 2024-06-30 NOTE — ED NOTES
Pt taken to room via EMS, sitter at bedside assisting pt into appropriate gown. Security wanding pt, belongings removed and inventoried. Pt's phone given to her daughter.

## 2024-06-30 NOTE — PATIENT INSTRUCTIONS
Thank you for allowing me to participate as part of your health care team, and thank you for choosing [facility].    LARS MILLER MD  Board Certified in Psychiatry & Addiction Medicine      IN CASE OF SUICIDAL THINKING, call the National Suicide Hotline Number: 988    988 Suicide & Crisis Lifeline: 988 , 0-094-328-TALK (8255)  [email or web address]://[email or web address]           AFTER VISIT INSTRUCTIONS:     [x] Take all medication, from all providers, as prescribed.  [x] If questions or concerns arise, or if experiencing side effects, adverse reactions or worsening symptoms, contact your provider through the [facility] portal at [email or web address], or call [phone] to reach the [facility] main line.  [x] In cases of emergencies, call 221 or 841, or present directly to the emergency department for immediate assistance.      INFORMATION ON MENTAL HEALTH MEDICATIONS:     National [place] of Mental Health:   https://www.nimh.nih.gov/health/topics/mental-health-medications     Web MD:   https://www.[employer]NEMS       RESOURCES:     IN CASE OF SUICIDAL THINKING, call the [facility] Suicide Hotline Number: 988    988 Suicide & Crisis Lifeline: 988 , 6-739-745-TALK (8255)  Provides 24/7, free and confidential support for people in distress, prevention and crisis resources for you or your loved ones, and best practices for professionals.    Call, text or chat.  https://[employer].org     National Action [place] for Suicide Prevention: the National Action [place] for Suicide Prevention (Action [place]) is the nations public-private partnership for suicide prevention, working with more than 250 national partners.   https://theactionalliance.org     National Strategy for Suicide Prevention & Risk Mitigation:  https://theactionalliance.org/our-strategy/national-strategy-suicide-prevention     [x] Fact Sheet:   https://www.hhs.gov/sites/default/files/national-strategy-for-suicide-prevention-factsheet.pdf     [x] Report:    https://www.ncbi.nlm.nih.gov/books/HBK315469/pdf/Bookshelf_NBK109917.pdf     Suicide Prevention Resource Center: The Suicide Prevention Resource Center (SPR) is the only federally supported resource center devoted to advancing the implementation of the National Strategy for Suicide Prevention. [facility] is funded by the U.S. Department of Health and Human Services' Substance Abuse and Mental Health Services Administration (SAMA).  https://www.[facility].org     [x] Safety Plan:   https://Ridango/wp-content/uploads/2021/08/Tari-Safety-Plan-8-6-21.pdf     [x] Suicide Risk Curve:  https://Ridango/wp-content/uploads/2021/08/Fidxqna-eyay-jnese-8-6-21.pdf     [place] Mental Health Advocacy Service: the state agency tasked with protecting the legal rights of people with behavioral health diagnoses.  https://mhas.[place].[facility]     Alcoholics Anonymous (AA): find a meeting near you.  https://www.aa.org     SMI Adviser: resources for individuals and families with serious mental illness.  https://smiadviser.org     National [place] for the Mentally Ill (MARGO): the nation's largest grassroots organization dedicated to building better lives for individuals with mental illness.  https://www.margo.org/Home     U.S. Department of Health and Human Services (HHS): the mission of HHS is to enhance the health and well-being of all Americans, by providing for effective health and human services and by fostering sound, sustained advances in the sciences underlying medicine, public health, and .   https://www.hhs.gov     Substance Abuse and Mental Health Services Administration (SAMHSA): SAMHSA is the agency within [facility] that leads public health efforts to advance the behavioral health of the nation. SAMHSA's mission is to reduce the impact of substance abuse and mental illness on [place]'s communities.   https://www.samhsa.gov     National Institutes of Health (NIH): a part of [facility], [facility] is  the largest biomedical research agency in the world.   https://www.nih.gov     National [place] on Drug Abuse (SILVINA): sponsored by the NIH, the mission of SILVINA is to advance science on drug use and addiction and to apply that knowledge to improve individual and public health.  https://silvina.nih.gov     National [place] on Alcohol Abuse and Alcoholism (NIAAA): sponsored by the NIH, the mission of NIAA is to generate and disseminate fundamental knowledge about the effects of alcohol on health and well-being, and apply that knowledge to improve diagnosis, prevention, and treatment of alcohol-related problems, including alcohol use disorder, across the lifespan.   https://www.niaaa.nih.gov     National Harm Reduction Coalition: resources for harm reduction, including techniques, strategies, policy, and advocacy.  https://harmreduction.org     The SHARE Approach - A Model for Shared Decision Making:  [x] Fact Sheet  https://www.[facility].gov/sites/default/files/publications/files/share-approach_factsheet.pdf     AMA Principles of Medical Ethics - Informed Consent & Shared Decision Making:  [x] Chapter  https://www.ama-assn.org/system/files/2019-06/code-of-medical-ktutsr-mfqwccl-5.pdf     Safety Netting for Primary Care:  [x] Article  https://www.ncbi.nlm.nih.gov/pmc/articles/DMW8332800/pdf/zvxizia-4725--e70.pdf       MEDICATION MANAGEMENT:     [x] In addition to the potential beneficial effects, the use of any medication or drug (prescribed, over the counter or otherwise) carries with it the risk of potential adverse effects.  Each has a set of typical adverse effects - some common, some rare - but idiosyncratic and unanticipated reactions unique to you are always possible.      [x] It is important to remember that untreated illness can also pose a risk, which must be taken into account when weighing the pros and cons of a medication trial.    [x] Medications and drugs can sometimes interact with each other in the  body, leading to adverse effects - it is important that all your providers know all the medications and drugs you take - prescribed, over the counter, or otherwise.  Keep all your practitioners up to date with any changes.  It's always a good idea to keep an up-to-date list in an easily accessible location.    [x] There is an inherent unpredictability to all treatment, including the use of medication.  Unexpected outcomes can occur - keep me up to date with any difficulties you encounter.    [x] It is important to take medication as directed, and to comply fully with the instructions.  Check with the appropriate provider first before adjusting or stopping your medication on your own.    If you require further information pertaining to the issues outlined above, please reach out to your providers through the [facility] portal at [email or web address], or call [phone] to discuss.  See resource list for additional material.     Additional information can be provided pertaining to your diagnosis, intended outcomes, target symptoms for treatment, and possible benefits and risks of medication - you can also access this information through the provided resources.  Possible alternatives to the current treatment plan (including no treatment) can also be reviewed.      GENERAL HEALTH & WELLNESS:     [x] Establish and follow regularly with a primary care physician for routine health maintenance and management of any medical comorbidities.  [x] Follow a healthy diet, exercise routinely, and monitor weight and metabolic parameters.  [x] Allow adequate time for sleep and practice good sleep hygiene.  [x] Do not operate a motor vehicle or heavy machinery if the effects of medications or the symptoms underlying your condition impair the ability for you to do so safely.    Dietary Guidelines for Americans, 6769-1866:  U.S. Department of Agriculture  (USDA)  https://www.dietaryguidelines.gov/sites/default/files/2020-12/Dietary_Guidelines_for_Americans_2020-2025.pdf#page=31     The Nutrition Source:  [facility] of [facility] Health  https://www.[facility].[place].[facility]/nutritionsource       SLEEP HYGIENE:     Follow these tips to establish healthy sleep habits:  [x] Keep a consistent sleep schedule. Get up at the same time every day, even on weekends or during vacations.  [x] Set a bedtime that is early enough for you to get at least 7-8 hours of sleep.  [x] Don't go to bed unless you are sleepy.  [x] If you don't fall asleep after 20 minutes, get out of bed. Go do a quiet activity without a lot of light exposure. It is especially important to not get on electronics.  [x] Establish a relaxing bedtime routine.  [x] Use your bed only for sleep and sex.  [x] Make your bedroom quiet and relaxing. Keep the room at a comfortable, cool temperature.  [x] Limit exposure to bright light in the evenings.  [x] Turn off electronic devices at least 30 minutes before bedtime.  [x] Don't eat a large meal before bedtime. If you are hungry at night, eat a light, healthy snack.  [x] Exercise regularly and maintain a healthy diet.  [x] Avoid consuming caffeine in the afternoon or evening.  [x] Avoid consuming alcohol before bedtime.  [x] Reduce your fluid intake before bedtime.    QUICK TIPS FOR BETTER SLEEP  Reduce smartphone usage Create and maintain a nightly ritual Avoid caffeine 4-6 hours before sleeping Don't eat or drink too much at bedtime Sleep at the same time every night        American Academy of Sleep Medicine - Healthy Sleep Habits:  https://sleepeducation.org/healthy-sleep/healthy-sleep-habits     American Academy of Sleep Medicine - Bedtime Calculator:  https://sleepeducation.org/healthy-sleep/bedtime-calculator     American Academy of Sleep Medicine - Cognitive Behavioral Therapy for Insomnia (CBT-I):  https://sleepeducation.org/patients/cognitive-behavioral-therapy      American Academy of Sleep Medicine - Insomnia:  https://sleepeducation.org/sleep-disorders/insomnia       ALCOHOL & DRUG USE COUNSELING:     Preventing Excessive Alcohol Use (CDC):  https://www.cdc.gov/alcohol/fact-sheets/moderate-drinking.htm#:~:text=To%20reduce%20the%20risk%20of,days%20when%20alcohol%20is%20consumed.     [x] Alcohol consumption is associated with a variety of short- and long-term health risks, including motor vehicle crashes, violence, sexual risk behaviors, high blood pressure, and various cancers (e.g., breast cancer).  [x] The risk of these harms increases with the amount of alcohol you drink. For some conditions, like some cancers, the risk increases even at very low levels of alcohol consumption (less than 1 drink).  [x] To reduce the risk of alcohol-related harms, the 7773-4942 Dietary Guidelines for Americans recommends that adults of legal drinking age can choose not to drink, or to drink in moderation by limiting intake to 2 drinks or less in a day for men or 1 drink or less in a day for women, on days when alcohol is consumed.  [x] The Guidelines also do not recommend that individuals who do not drink alcohol start drinking for any reason and that if adults of legal drinking age choose to drink alcoholic beverages, drinking less is better for health than drinking more.  [x] The Guidelines note that some people should not drink alcohol at all, such as:  - If they are pregnant or might be pregnant.  - If they are younger than age 21.  - If they have certain medical conditions or are taking certain medications that can interact with alcohol.  - If they are recovering from an alcohol use disorder or if they are unable to control the amount they drink.  [x] The Guidelines also note that not drinking alcohol is the safest option for women who are lactating. Generally, moderate consumption of alcoholic beverages by a woman who is lactating (up to 1 standard drink in a day) is not known to  "be harmful to the infant, especially if the woman waits at least 2 hours after a single drink before nursing or expressing breast milk. Women considering consuming alcohol during lactation should talk to their healthcare provider.  [x] The Guidelines note, Emerging evidence suggests that even drinking within the recommended limits may increase the overall risk of death from various causes, such as from several types of cancer and some forms of cardiovascular disease. Alcohol has been found to increase risk for cancer, and for some types of cancer, the risk increases even at low levels of alcohol consumption (less than 1 drink in a day).  [x] Although past studies have indicated that moderate alcohol consumption has protective health benefits (e.g., reducing risk of heart disease), recent studies show this may not be true.  [x] Its important to focus on the amount people drink on the days that they drink. Even if women consume an average of 1 drink per day or men consume an average of 2 drinks per day, binge drinking increases the risk of experiencing alcohol-related harm in the short-term and in the future.    Drinking Levels Defined (NIAAA):  https://www.niaaa.nih.gov/alcohol-health/overview-alcohol-consumption/moderate-binge-drinking     Drinking in Moderation:  According to the "Dietary Guidelines for Americans 6950-5753, U.S. Department of Health and Human Services and U.S. Department of Agriculture, adults of legal drinking age can choose not to drink or to drink in moderation by limiting intake to 2 drinks or less in a day for men and 1 drink or less in a day for women, when alcohol is consumed. Drinking less is better for health than drinking more.    Binge Drinking:  NIAAA defines binge drinking as a pattern of drinking alcohol that brings blood alcohol concentration (FOSTER) to 0.08 percent - or 0.08 grams of alcohol per deciliter - or higher.  For a typical adult, this pattern corresponds to consuming 5 " or more drinks (male), or 4 or more drinks (female), in about 2 hours.    The Substance Abuse and Mental Health Services Administration (SAMHSA), which conducts the annual National Survey on Drug Use and Health (NSDUH), defines binge drinking as 5 or more alcoholic drinks for males or 4 or more alcoholic drinks for females on the same occasion (i.e., at the same time or within a couple of hours of each other) on at least 1 day in the past month.    Heavy Alcohol Use:  NIAAA defines heavy drinking as follows:  - For men, consuming more than 4 drinks on any day or more than 14 drinks per week.  - For women, consuming more than 3 drinks on any day or more than 7 drinks per week.     [facility] defines heavy alcohol use as binge drinking on 5 or more days in the past month.    Patterns of Drinking Associated with Alcohol Use Disorder:  Binge drinking and heavy alcohol use can increase an individual's risk of alcohol use disorder.    Certain people should avoid alcohol completely, including those who:  - Plan to drive or operate machinery, or participate in activities that require skill, coordination, and alertness.  - Take certain over-the-counter or prescription medications.  - Have certain medical conditions.  - Are recovering from alcohol use disorder or are unable to control the amount that they drink.  - Are younger than age 21.  - Are pregnant or may become pregnant.    U.S. Standard Drink  12 oz beer   (5% ABV) 8 oz malt liquor   (7% ABV) 5 oz wine   (12% ABV) 1.5 oz 80-proof distilled spirit  (40% ABV)        Heroin use harm reduction:  1. Carry naloxone. When using heroin, make sure you have at least one dose of naloxone - the overdose reversal drug - and have it in plain view. Understand how to give it.  2. Try a small dose first. It is best to first try a small amount of the heroin to check the effect.  3. Dont use heroin alone. Always use heroin with someone else and take turns while using.    It is possible to  overdose with heroin whether you are snorting, injecting or using it in another form.    Signs of an overdose or emergency:   - The person is awake but unable to talk.  - Their body is limp.  - Their breathing is shallow or slow or stopped.  - Their skin is pale, ashen or clammy/sweaty.  - They are unconscious.    In case of emergency, give naloxone. If you suspect the heroin may contain fentanyl, administer more than one dose. Seek medical help even if naloxone has been given. Call 911 for help.      ADHD TREATMENT AND STIMULANT MEDICATIONS:     [facility] Prescription Stimulants Drug Facts  CMS Stimulant and Related Medications: Use in Adults  [facility] Drug Fact Sheets: Stimulants  FDA Drug Safety Communication: Stimulants  [facility] ADHD  WebMD ADHD Medications and Side Effects  [facility]: ADHD Medication      SHARED DECISION MAKING & INFORMED CONSENT:     Shared medical decision making and informed consent are the hallmark and bedrock of excellent clinical care.  During the encounter, shared medical decision making was employed and informed consent was obtained, to the degree possible, whenever feasible, appropriate and relevant. Those interventions are supplemented here with written materials, detailing the topics in more depth.       PSYCHOEDUCATION:     Psychoeducation pertaining to the following -     Diagnosis Etiology Disease Processes Natural Progression   Treatment Options Time Course Safety Netting Informed Consent   Intended Benefits of Medication Expectable Adverse Effects Target Symptoms for Treatment Alternatives to Current Treatment   Shared   Decision Making Risk Mitigation Strategies Harm Reduction Techniques Associated Bio-Med Complications     - can be further discussed and reviewed (you can also access additional information through the provided resources in this document).      Effective communication is essential in order to engage in shared medical decision making.  If you had difficulty understanding  anything during your encounter or in this supplementary document, please contact your providers through the [facility] portal at [email or web address] or call [phone].     Marie Dictionary  https://dictionary.marie.org/us       It can be easy to miss, forget, or misremember important important information that was discussed during the session - especially when you're stressed, upset, or don't feel well.  If you or a representative have any additional questions, concerns, or topics to discuss - please contact your providers through the [facility] portal at [email or web address] or call [phone].    Memory Loss  https://www.[employer]Gains.Echobit/brain/memory-loss    Causes of Memory Loss  https://www.[employer]/what-causes-memory-loss-6059874    Memory loss: When to seek help  https://www.[facility].org/diseases-conditions/alzheimers-disease/in-depth/memory-loss/art-94196493    Memory, Forgetfulness, and Aging: What's Normal and What's Not?  https://www.chay.nih.gov/health/memory-forgetfulness-and-aging-whats-normal-and-whats-not    Depression and Memory Loss  https://www.[employer]/health/depression/depression-and-memory-loss    The Relationship Between Anxiety and Memory Loss  https://www.[facility]/academics/blog-posts/the-relationship-between-anxiety-and-memory-loss     PRESCRIPTION DRUG MANAGEMENT:     Prescription Drug Management entails the following:  [x] The review, recommendation, or consideration without recommendation of medications during the encounter.  [x] Discussion (to the extent possible) with the patient and/or other interested parties of the diagnosis, target symptoms, intended outcomes, and possible benefits and risks of medication, as well as alternatives (including no treatment), if not otherwise known or stated prior.  [x] Discussion (to the extent possible) with the patient and/or other interested parties of possible expectable adverse effects of any proposed individual  psychotropic agents, as well as the inherent unpredictability of treatment, if not otherwise known or stated prior.  [x] Informed consent is sought from the patient (and/or guardian/designated decision maker, if applicable) after a thorough discussion (to the extent possible) of the aforementioned points outlined above.  [x] The provision of counseling (to the extent possible) to the patient and/or other interested parties on the importance of full compliance with any prescribed medication, if not otherwise known or stated prior.    Information on psychotropic medication can be found at:   National [place] of Mental Health: Information on Mental Health Medications      RISK MITIGATION, HARM REDUCTION & SAFETY NETTING:     Risk Mitigation Strategies, Harm Reduction Techniques, and Safety Netting are important interventions that can reduce acute and chronic risk.  As such, opportunities were sought to incorporate psychoeducation and practical advice pertaining to these topics into the encounter, to the degree possible, whenever feasible, appropriate and relevant.  Those interventions are supplemented here with written materials, detailing the topics in more depth.       RISK MITIGATION STRATEGIES:     Risk mitigation strategies are used to reduce the likelihood of future episodes of suicide, homicide, violence, and/or other problematic behaviors (e.g. self-injurious, risky, addictive, compulsive, impulsive). The following are examples of risk mitigation strategies which you can employ in order to reduce your overall burden of risk.     [x] Treatment of underlying psychopathology driving acute and chronic risk to the extent possible.  [x] Use of self administered rating scales and journaling to assist in risk tracking.  [x] Exploration of protective factors to potentially counterbalance risk.  [x] Identification and avoidance of triggers and situations that increase risk, including excessive alcohol and drug  use.  [x] Timely follow up and ongoing treatment of mental health issues moving forward.  [x] Full compliance with medication regimen.  [x] A good working knowledge of your medication regimen, including specific instructions on the administration of the medications.  [x] Consultation with an appropriate medical provider prior to altering or deviating from these instructions on your own.  [x] Active involvement and participation of family and natural support wherever feasible and possible.  [x] Development and review of coping strategies that can be immediately deployed in times of acute crisis.  [x] Implementation of home safety practices and the removal/reduction of access to lethal means (including, but not limited to, firearms, certain types and quantities of medication, poisons, or other methods you may have contemplated or identified).  [x] Collaborative development of a written safety plan with your treatment team and loved ones that can be immediately referred to in times of acute crisis.  [x] Utilization of a safety contract to engage your treatment team and further assess/manage risk.  [x] A good working knowledge of how to access emergency treatment in times of acute crisis.  [x] Utilization of suicide hotlines number (988) and resources in times of crisis.    If you require further information pertaining to the issues outlined above, please reach out to your providers through the [facility] portal at [email or web address], or call [phone] to discuss.  See resource list for additional material.      SAFETY NETTING:     In healthcare, safety netting refers to the provision of information to help patients or carers identify the need to consult a health care professional if a health concern arises or changes.  The relevance of this advice is most obvious with chronic mental illnesses, as their dynamic nature, with symptoms and signs emerging at different times and in different combinations, makes safety  netting particularly important.  Specific safety net advice for you includes the following:    [x] The existence of uncertainty. Mental health diagnoses and conditions contain at least some degree of uncertainty - knowing this, you should feel empowered to reconsult if necessary.  [x] What exactly to look out for. Given the recognised risk of possible deterioration or the development of complications, you should become familiar with the specific clinical features (including red flags) to look out for.    [x] How exactly to seek further help. You should know how and where to seek further help if needed.  Make a plan in advance and keep it handy.  It's also a good idea to share the plan with your treatment providers and loved ones.  [x] What to expect about time course. Mental health diagnoses and conditions often have an expected time course, which is important information for you to know.  However, if your difficulties do not conform to this time line and concerns arise, do not delay seeking further medical advice.    If you require further information pertaining to the issues outlined above, please reach out to your providers through the [facility] portal at [email or web address], or call [phone] to discuss.  See resource list for additional material.      HARM REDUCTION:     Harm Reduction techniques are used in an effort to reduce negative consequences associated with risky and maladaptive behaviors, until cessation of the problematic behaviors can be established.  Harm reduction is best thought of as a journey and not a destination; it is not an endorsement of problematic behavior, but an acknowledgement and recognition of the step-by-step nature of recovery.      Although commonly employed in working with people who suffer with drug addiction, harm reduction can be more broadly applied to any problematic behavior.    Harm Reduction and Substance Abuse:  [x] Incorporates a spectrum of strategies that  includes safer use, managed use, abstinence, meeting people who use drugs where theyre at, and addressing conditions of use along with the use itself.  [x] Accepts, for better or worse, that licit and illicit drug use is part of our world and chooses to work to minimize its harmful effects rather than simply ignore or condemn them.  [x] Understands drug use as a complex, multi-faceted phenomenon that encompasses a continuum of behaviors from severe use to total abstinence, and acknowledges that some ways of using drugs are clearly safer than others.  [x] Calls for the non-judgmental, non-coercive provision of services and resources to people who use drugs and the communities in which they live in order to assist them in reducing attendant harm.  [x] Affirms people who use drugs themselves as the primary agents of reducing the harms of their drug use and seeks to empower them to share information and support each other in strategies which meet their actual conditions of use.  [x] Does not attempt to minimize or ignore the real and tragic harm and danger that can be associated with illicit drug use.  [x] Meets people where they are, but seeks to not leave them there.  [x] Examples of specific interventions include, but are not limited to, narcan (naloxone), medication assisted treatment, syringe access, overdose prevention, and safer drug use techniques.    Key Harm Reduction Strategies: Opioid Use Disorder  [x] Safe Injection Sites & Equipment  [x] Managed Use  [x] Syringe Exchange Programs  [x] Fentanyl Test Strips  [x] Pharmacotherapy/Medication Assisted Treatment  [x] Narcan  [x] Good Mu-ism Laws  [x] Treatment Instead of [facility]  [x] Diversion Programs  [x] Overdose Education  [x] Abstinence    Whether or not you struggle with substance abuse, any and all opportunities to employ harm reduction techniques to address difficult to change problematic behaviors should be sought and implemented - whenever and  "wherever feasible, relevant and applicable. Additionally, harm reduction techniques can be applied broadly, and are relevant for a multitude of situations - even those that do not involve problematic or maladaptive behaviors.     EXAMPLES OF HARM REDUCTION IN OTHER AREAS  SUN SCREEN SEAT BELTS SPEED LIMITS BIRTH CONTROL        If you require further information pertaining to the issues outlined above, please reach out to your providers through the [facility] portal at [email or web address], or call [phone] to discuss.  See resource list for additional material.      FIREARM SAFETY:     THE SIX BASIC GUN SAFETY RULES  There are six basic gun safety rules for gun owners to understand and practice at all times:  Treat all guns as if they are loaded. Always assume that a gun is loaded even if you think it is unloaded. Every time a gun is handled for any reason, check to see that it is unloaded. If you are unable to check a gun to see if it is unloaded, leave it alone and seek help from someone more knowledgeable about guns.  Keep the gun pointed in the safest possible direction. Always be aware of where a gun is pointing. A "safe direction" is one where an accidental discharge of the gun will not cause injury or damage. Only point a gun at an object you intend to shoot. Never point a gun toward yourself or another person.  Keep your finger off the trigger until you are ready to shoot. Always keep your finger off the trigger and outside the trigger guard until you are ready to shoot. Even though it may be comfortable to rest your finger on the trigger, it also is unsafe. If you are moving around with your finger on the trigger and stumble or fall, you could inadvertently pull the trigger. Sudden loud noises or movements can result in an accidental discharge because there is a natural tendency to tighten the muscles when startled. The trigger is for firing and the handle is for handling.  Know your target, its " surroundings and beyond. Check that the areas in front of and behind your target are safe before shooting. Be aware that if the bullet misses or completely passes through the target, it could strike a person or object. Identify the target and make sure it is what you intend to shoot. If you are in doubt, DON'T SHOOT! Never fire at a target that is only a movement, color, sound or unidentifiable shape. Be aware of all the people around you before you shoot.  Know how to properly operate your gun. It is important to become thoroughly familiar with your gun. You should know its mechanical characteristics including how to properly load, unload and clear a malfunction from your gun. Obviously, not all guns are mechanically the same. Never assume that what applies to one make or model is exactly applicable to another. You should direct questions regarding the operation of your gun to your firearms dealer, or contact the  directly.  Store your gun safely and securely to prevent unauthorized use. Guns and ammunition should be stored separately. When the gun is not in your hands, you must still think of safety. Use an approved firearms safety device on the gun, such as a trigger lock or cable lock, so it cannot be fired. Store it unloaded in a locked container, such as an approved lock box or a gun safe. Store your gun in a different location than the ammunition. For maximum safety you should use both a locking device and a storage container.    ADDITIONAL SAFETY POINTS  The six basic safety rules are the foundational rules for gun safety. However, there are additional safety points that must not be overlooked.  [x] Never handle a gun when you are in an emotional state such as anger or depression. Your judgment may be impaired. If you have acute or chronic suicidal ideation, a suicide plan, or suicidal intent, have firearms removed and your access restricted by a trusted loved one or other responsible individual  "or agency.  [x] Never shoot a gun in celebration (the Fourth of July or New Year's Brittany, for example). Not only is this unsafe, but it is generally illegal. A bullet fired into the air will return to the ground with enough speed to cause injury or death.  [x] Do not shoot at water, flat or hard surfaces. The bullet can ricochet and hit someone or something other than the target.  [x] Hand your gun to someone only after you verify that it is unloaded and the cylinder or action is open. Take a gun from someone only after you verify that it is unloaded and the cylinder or action is open.  [x] Guns, alcohol and drugs don't mix. Alcohol and drugs can negatively affect judgment as well as physical coordination. Alcohol and any other substance likely to impair normal mental or physical functions should not be used before or while handling guns. Avoid handling and using your gun when you are taking medications that cause drowsiness or include a warning to not operate machinery while taking this drug.   [x] The loud noise from a fired gun can cause hearing damage, and the debris and hot gas that is often emitted can result in eye injury. Always wear ear and eye protection when shooting a gun.      GUNS AND CHILDREN - FIREARM OWNER RESPONSIBILITIES    You Cannot Be Too Careful with Children and Guns  [x] There is no such thing as being too careful with children and guns. Never assume that simply because a toddler may lack finger strength, they can't pull the trigger. A child's thumb has twice the strength of the other fingers. When a toddler's thumb "pushes" against a trigger, invariably the barrel of the gun is pointing directly at the child's face. NEVER leave a firearm lying around the house.  [x] Child safety precautions still apply even if you have no children or if your children have grown to adulthood and left home. A nephew, niece, neighbor's child or a grandchild may come to visit. Practice gun safety at all " "times.  [x] To prevent injury or death caused by improper storage of guns in a home where children are likely to be present, you should store all guns unloaded, lock them with a firearms safety device and store them in a locked container. Ammunition should be stored in a location separate from the gun.    Talking to Children About Guns  [x] Children are naturally curious about things they don't know about or think are "forbidden." When a child asks questions or begins to act out "gun play," you may want to address his or her curiosity by answering the questions as honestly and openly as possible. This will remove the mystery and reduce the natural curiosity. Also, it is important to remember to talk to children in a manner they can relate to and understand. This is very important, especially when teaching children about the difference between "real" and "make-believe." Let children know that, even though they may look the same, real guns are very different than toy guns. A real gun will hurt or kill someone who is shot.    Instill a Mind Set of Safety and Responsibility  [x] The American Academy of Pediatrics reports that adolescence is a highly vulnerable stage in life for teenagers struggling to develop traits of identity, independence and autonomy. Children, of course, are both naturally curious and innocently unaware of many dangers around them. Thus, adolescents as well as children may not be sufficiently safeguarded by cautionary words, however frequent. Contrary actions can completely undermine good advice. A "Do as I say and not as I do" approach to gun safety is both irresponsible and dangerous.  [x] Remember that actions speak louder than words. Children learn most by observing the adults around them. By practicing safe conduct you will also be teaching safe conduct.    Safety and Storage Devices  [x] If you decide to keep a firearm in your home you must consider the issue of how to store the firearm in a " safe and secure manner. There are a variety of safety and storage devices currently available to the public in a wide range of prices. Some devices are locking mechanisms designed to keep the firearm from being loaded or fired, but don't prevent the firearm from being handled or stolen. There are also locking storage containers that hold the firearm out of sight. For maximum safety you should use both a firearm safety device and a locking storage container to store your unloaded firearm.   Two of the most common locking mechanisms are trigger locks and cable locks. Trigger locks are typically two-piece devices that fit around the trigger and trigger guard to prevent access to the trigger. One side has a post that fits into a hole in the other side. They are locked by a key or combination locking mechanism. Cable locks typically work by looping a strong steel cable through the action of the firearm to block the firearm's operation and prevent accidental firing. However, neither trigger locks nor cable locks are designed to prevent access to the firearm.   [x] Smaller lock boxes and larger gun safes are two of the most common types of locking storage containers. One advantage of lock boxes and gun safes is that they are designed to completely prevent unintended handling and removal of a firearm. Lock boxes are generally constructed of sturdy, high-grade metal opened by either a key or combination lock. Gun safes are quite heavy, usually weighing at least 50 pounds. While gun safes are typically the most expensive firearm storage devices, they are generally more reliable and secure.     Remember: Safety and storage devices are only as secure as the precautions you take to protect the key or combination to the lock.    RULES FOR KIDS  Adults should be aware that a child could discover a gun when a parent or another adult is not present. This could happen in the child's own home; the home of a neighbor, friend or  relative; or in a public place such as a school or park. If this should happen, a child should know the following rules and be taught to practice them.   Stop  The first rule for a child to follow if he/she finds or sees a gun is to stop what he/she is doing.  Don't Touch!  The second rule is for a child not to touch a gun he/she finds or sees. A child may think the best thing to do if he/she finds a gun is to pick it up and take it to an adult. A child needs to know he/she should NEVER touch a gun he/she may find or see.  Leave the Area  The third rule is to immediately leave the area. This would include never taking a gun away from another child or trying to stop someone from using gun.  Tell an Adult  The last rule is for a child to tell an adult about the gun he/she has seen. This includes times when other kids are playing with or shooting a gun.     METHODS OF CHILDPROOFING YOUR FIREARM  As a responsible handgun owner, you must recognize the need and be aware of the methods of childproofing your handgun, whether or not you have children.  Whenever children could be around, whether your own, or a friend's, relative's or neighbor's, additional safety steps should be taken when storing firearms and ammunition in your home.  [x] Always store your firearm unloaded.  [x] Use a firearms safety device AND store the firearm in a locked container.  [x] Store the ammunition separately in a locked container.  Always storing your firearm securely is the best method of childproofing your firearm; however, your choice of a storage place can add another element of safety. Carefully choose the storage place in your home especially if children may be around.  [x] Do not store your firearm where it is visible.  [x] Do not store your firearm in a bedside table, under your mattress or pillow, or on a closet shelf.  [x] Do not store your firearm among your valuables (such as jewelry or cameras) unless it is locked in a secure  container.  [x] Consider storing firearms not possessed for self-defense in a safe and secure manner away from the home.    [facility] for [facility] Safety:  https://www.everytown.org       Gun Violence: Prediction, Prevention and Policy  American Psychological Association Panel of Experts Report  https://www.apa.org/pubs/reports/gun-violence-report.pdf     If you require further information pertaining to any of the issues outlined above, please reach out to your providers through the [facility] portal at [email or web address], or call [phone] to discuss.  See resource list for additional material.      IN CASE OF SUICIDAL THINKING, call the HealthRally Suicide Hotline Number: 988    988 Suicide & Crisis Lifeline: 988 , 7-882-125-TALK (8255)  Provides 24/7, free and confidential support for people in distress, prevention and crisis resources for you or your loved ones, and best practices for professionals.    Call, text or chat.  https://Leyou software.Atlantium              REFERRAL RECOMMENDATIONS FOR SUBSTANCE ABUSE & MENTAL HEALTH      IN CASE OF SUICIDAL THINKING, call the HealthRally Suicide MetaMaterialsline Number: 988    988 Suicide & Crisis Lifeline: 988 , 0-765-156-TALK (8255)  https://Leyou software.Atlantium       SUBSTANCE ABUSE:     [facility] (formerly known as the ABU)  [x] [phone], Option 2  [x] [address], [place] [address]  [x] [email or web address]  [x] [facility] delivers comprehensive and collaborative treatment for alcohol and substance use disorders.  Excellent program for working professionals or anyone else seeking recovery.  [x] Requires insurance approval prior to starting program, call number above for more information.  [x] Intensive Outpatient Rehabilitation Program - M-F 9am-3pm - daily groups with psychologists and social workers, sessions with MDs 3x per week   [x] Ambulatory detox and dual diagnosis  available      SUBOXONE:     NOTE: some Suboxone clinics require their clients to participate in a structured program (such as an IOP) in order to be prescribed Suboxone.  Some clinics have a long waiting list.  Most of these clinics do not accept walk-in clients, so call first to to learn what must be done to get started on Suboxone.    [facility] - [phone] (can do Sublocade)  [address], [place] [address]    [facility]  [address], [place]  [phone]    [facility] - [phone] (can do Sublocade)  [address] [place] [address]    [facility]  [address] [place]  [phone]     [facility] (very short waiting list, may accept some walk-in's but call first if possible)  [address], [place] [id number] [phone]; [phone]    [facility]   [address] [place]    [phone]    [facility] (can usually be seen within a week but is cash only for appointment)  [address] [place] [address] [place], [place]    [facility] ([facility])  [address] [place]  [phone]    [facility] ([facility])  [address], [place] [id number] [phone]    [place], [place]:    [facility] - [address] [place] [address] - Tel: [phone]    [facility] - [address] [place] [address] - Tel: [phone]    Gilbert Wilson - [address] [address] - Tel: [phone]    Melecio Hand - [address] [place] [address] - Tel: [phone]    David Hussein - [address] [place] [address] - Tel: [phone]    [place], [place]:     Dr. Anali Pike and Dr. Santana Shepherd - [address] [facility] Tel: [phone]    Dr. Theresa Trammell - [address] [place] [address] Bryan Mathur [place] - Tel: [phone]    Dr. Chente Loyd - Tel: [phone]    Dr. Jayson Vázquez - [facility] - [phone]      METHADONE:     [facility] (the only methadone clinic in the  city, has two locations)  [x] [place] - [facility] [place] [address], [phone]  [x] [facility] [place] [address], [phone]      12 STEP PROGRAMS (and similar):     Alcoholics Anonymous (local)  [x] [phone]  [x] www.[place].org for schedules for in-person and online meetings  [x] There are AA meetings throughout the day all over town  [x] AA costs nothing to attend; they pass a basket for donations but this is not required    Narcotics Anonymous  [x] [phone]  [x] www.noana.org  [x] There are NA meetings throughout the day all over [facility]  [x] NA costs nothing to attend; they pass a basket for donations but this is not required    Alcoholics Anonymous Online Intergroup (national)  [x] www.aa-intergroup.org  [x] Good resource for large, nation-wide meetings  [x] Can also attend smaller, local meetings in other cities  [x] Countless meetings all day and all night  [x] AA costs nothing to attend; they pass a basket for donations but this is not required    [facility] - 24/7 zoom meetings for women and coed - sign on anytime, anywhere!  [email or web address]    Online Intergroup of AA - [address] - 24/7 zoom meetings  https://aa-intergroup.org/meetings/    LOOKING FOR AN ALTERNATIVE TO 12 STEP PROGRAMS - check out:  [facility]: https://www.smartrecovery.org/about-us  [facility]: https://[email or web address]      DETOX UNITS (USUALLY 5-7 DAYS):     [facility]: [address], [place]  [phone], call first to ensure bed availability    [facility]: [address] [place]  [phone], Option 1, call first to ensure bed availability    [facility]: [address], [place]  [phone] (intake by appointment only)    [facility]: [address], [place]  [phone]      INTENSIVE OUTPATIENT PROGRAMS:     [facility] (formerly known as the [facility])  [x]  [phone], Option 2  [x] [address], [facility] [address]  [x] [email or web address]  [x] [facility] delivers comprehensive and collaborative treatment for alcohol and substance use disorders.  Excellent program for working professionals or anyone else seeking recovery.  [x] Requires insurance approval prior to starting program, call number above for more information.  [x] Intensive Outpatient Rehabilitation Program - M-F 9am-3pm - daily groups with psychologists and social workers, sessions with MDs 3x per week   [x] Ambulatory detox and dual diagnosis available    [facility]  [x] [phone]  [x] [facility] (the clinic not on [facility])  [x] Call number above for more info and to check insurance requirements    [facility]  [address] [place] [address] [phone]    [facility]:  [address] [place], [place] [address]?, [phone]  [address] [facility]?, [place], [place] [address]?, [phone]    RESIDENTIAL REHABS (USUALLY 28 DAYS):     [facility]: [address], [phone]    [facility]: [facility] , [facility]  [phone] (intake by appointment only)    [facility] (men only) [address] [facility], [phone]    [facility] (Female only) [address] [facility], [phone]    [facility]: [address], [facility], men's program [phone], women's program [phone]    [facility]: [address], [facility], [phone]    [facility]: [address], [place], [phone]    [facility]: Men only, [phone], [address] [place]    [facility]: [address], [place], [place], [phone]    [facility]: [address] [place] [address],  [phone]  New Location: [address] [place] [address] [address],  [place] [address], [phone]    [facility]:   ?[address]?[place], [place] [address]?[phone]    [place]: [address], [place] [address], [phone]    [place]: [place], [phone]     [facility]: [place], [place], [phone]    [facility]: [place], [place], [phone]    [facility]: [place], [place], [phone]    [place]: [place], [place], [phone]    [facility]: [address] [place] [address], [phone]    COMMUNITY ADDICTION CLINICS:     ACER: [address] [facility], [facility] [place], [phone] -or- [address] [place] [address]    [facility] [place]: [address] [facility] [place]  [address]     [facility]: Clinics [phone]; Crisis [phone]    [facility]: [address] [place] [address]    [facility]: [address] [place] [address]    [facility]: [address], [place] [address] Center: 2nd Floor [address] [place] [address]    [facility]: [address], [place] [address]    [facility], [place] [address]    [facility]: [address] [place] [address] [phone]  (serves youth 16-23 years old)    [facility]: [facility]/[place]/[facility] [phone]    [facility]: [address] [facility], [place] [phone]    [facility]: [address], [facility] [phone]    [facility]: [address] [facility], [address], [phone]     [facility]: [address] [facility] Callie Cevallos, [phone], [phone]    RESOURCES IN OTHER [facility]:     [facility]: [facility] F. Radames Roper, [place]  "[place], [phone], [phone]    [facility]: [address] [facility], [address], [phone]    [facility], [address] [place] [address], [phone]    [facility]: [address] [facility], [phone]    [facility], [address] [facility], [phone] ([facility])    [place] [facility], [address] [facility], [phone] ([facility])    [facility], [address] [facility], [address], [place], [phone] ([facility], and [facility])    [facility], [address] [facility], [phone] ([facility])    [facility] [place] Hotline [phone], [phone]    [facility], [address] [facility] [place], [facility] [place], 232 [facility], [address], [facility], [address] [facility]al [facility], 500 [facility]. [address]., [facility], [address], [facility] Human [facility], [address], [address] [phone]    [facility], [address] [facility] [phone]    [facility], [address] [facility], [phone]    [facility], 85 [facility] [place], [phone]      Maltese SPEAKING (en español):     Información de la reunión de Alcohólicos Anónimos  [facility], 10:00 am  Habla español  Esta reunión está abierta y cualquiera puede asistir.    Sammarinese speaking Alcoholics anonymous meetings:  El "Oh [place] AA Skype" es un oh on line de Alcohólicos Anónimos en español. El oh es shashank, gratuito y virtual a través de Skype Audio. El oh funciona mediante riana llamada grupal de " voz, por lo que no se utiliza la videollamada, ni se pueden awais las imágenes o rostros de los participantes. Hace bello años y medio abrimos el primer Oh de AA por Skfred en [place], rupali actualmente asisten personas desde [place], [place], [place], [place], [place], [place], [place], [place], [place], [place], [place] y [place], entre otros.    El oh es muy útil para los alcohólicos que residen en lugares donde no se celebran reuniones de AA, o residen en lugares donde las reuniones de AA son un número limitado de días a la semana, o para aquellos compañeros que se hayan de viaje o que, por cualquier motivo, se hayan convalecientes y no pueden desplazarse. Todos los días nos reuniones a las 21:00 (hora española)    Podéis obtener más información sobre el oh y emilia sesiones en la página web [email or web address]      MENTAL HEALTH:     [facility] Out[facility]  [phone]    [facility] - General Psychiatry Intensive Outpatient Program  [facility] (formerly known as the BMU)  [phone], option 3    [facility] - Dual Diagnosis Intensive Outpatient Program  [facility] (formerly known as the ABU)  [phone], option 2      COMMUNITY MENTAL HEALTH CENTERS:     [facility]  ([facility])  Serves [facility], and [facility] residents.  Serves uninsured patients & those with Medicaid.  Main location: [address] [place] [address]  [phone]  Walk-in's available during regular business hours.  24/7 Crisis Line: [phone]    [facility]  (aka [place] [facility], [facility])  Serves [facility].  Serves uninsured patients, those with Medicaid and some private plans.  Walk-in's available during regular business hours.  Primary care services available  as well.  [facility]: [address] [facility] [address] [place] [address];  [phone]  [place]: [address] [place] [address];  [phone]  24/7 Crisis Line: [phone]    [facility]  Serves uninsured patients & those with Medicaid, call for more info.  Primary care, pediatrics, HIV treatment, and dentistry services available as well.  Three locations.  [phone]    [facility] [place]Corporate Office  Serves patients with Medicaid, Medicare, and private insurance  [address]  [place],?[place] [address]  [phone]    [facility]  Serves uninsured on a sliding scale, as well as Medicaid, Medicare, and private plans.  Eight locations around the [facility].  [phone]    [facility]  Serves uninsured patients & those with Medicaid, private insurances.  Primary care available as well.  [phone]  [address] [place] [address]    [facility]  Serves veterans who were honorably discharged.  [address] [place] [address]  [phone]  24/7 Veterans Crisis Line: [phone] (Press 1)    If you have private insurance and need to find a specialist, please contact your insurance network to request a list of providers covered by your benefits.      MENTAL HEALTH/ADDICTIVE DISORDERS:     AA ([phone]), NA ([phone])   National Suicide Prevention Lifeline- Call 1-458.895.9923 Available 24 hours everyday  [facility] [phone]; Crisis Line [phone] - Call for options A-F:  Intensive Outpatient Treatment/ Day programs   [facility], please contact   [facility], please contact [phone] or [phone] to speak with an admissions counselor.  [facility] (Methadone Maintenance)   [address] [place] [address], [phone]  [address] [place] [address] [phone]  [facility], [address] [address], [phone]  [place] [facility] [phone]  [facility] please contact [phone]  [facility], [address], 4th [place] [address] Phone: [phone]   [facility]  [place] Office: [address] [place] [address], [phone]  [place] Office: [address] [facility], [address], [place] [address], [phone]  [place] Office: [address] [place], [place] [address] [phone]    Outpatient Substance Abuse Treatment   [facility] (Methadone Maintenance)   [address] [place] [address], [phone]  [address] [place] [address] [phone]  [facility], [address] [address], [phone]  [facility]  [place] Office: [address] [address], [phone]  [place] Office: [address] [facility], [address], [place] [address], [phone]  [place] Office: [address] [facility] [place] [address] [phone]  [place] [facility], [address] [place] [address] [phone]   [facility], [address] [facility], [address], [phone]  [facility], [address] [place], [place] [phone]    Residential Substance Abuse Treatment   [facility] [address] [facility], [phone] or [phone]  [facility], [address] [facility], [phone]  [facility] (men only) [address] [place] [address], [place] [address], [phone]  [facility] (women only) [address] [place] [address] [phone]  [facility], [address] [place] [address] [phone]  [facility] (women only), intakes at [address] [facility] [facility], [phone]  [facility] (7-day program, $100, [address] [place], [phone], [phone], [phone])  [facility]  (Men only, [phone]), [address] (Vets*/Non-Vets)  [facility] (Men only, $400/month program fee) [address] [place], [phone]  [facility] (Women over age 39 only), [address] [facility], [phone]    Out of Area:    [place], [address], [place], [place] ([phone])  [facility] Recovery Program (men only), [address] [facility] [place] [address], [phone]  [facility], [address] [place], [place] ([phone])  [place], [facility] [place], [place] ([phone])  [facility], [address] [facility], [phone]  [facility] (Women only, has to have mental illness, can be homeless or substance abuser), [phone]        DOMESTIC VIOLENCE RESOURCES:     Advocacy  [facility] (NOF[facility])  [address] [address]    [facility] ? [phone]  Services provided: emergency shelter, individual advocacy, information and referrals, group support, children's program, medical advocacy, forensic medical exams, primary care, legal assistance, counseling, safety planning, and caregiver support    [facility] HEALING AND EMPOWERMENT [place]  Confidential location  [facility] ? [phone]  Services provided: short term emergency shelter, all services provided are free of charge    [facility] CENTERS FOR COMMUNITY ADVOCACY  Multiple locations in [place], [facility], [place], [facility], [place], and [facility] ([place], [place], and [place])    [place] ? [phone]  Services provided: emergency shelter, individual advocacy, information and referrals, group support, children's program, medical advocacy, legal assistance in obtaining restraining orders, counseling, safety planning, and caregiver support    [facility]   Emergency Shelter   [phone]  Emergency Services ,Legal and Financial Assistance Services ,Housing Services ,Support Services      [place] [facility]   [phone]  Emergency Services ,Counseling Services , Housing Services ,Support Services ,Children's Services     WOMEN WITH A VISION  [address] [place] [address]  WWAV ? [phone]  Services provided: advocacy, health education and supportive services, specializing in free healing services for marginalized groups, including LGBTQ individuals and sex workers    SEXUAL TRAUMA AWARENESS AND RESPONSE (STAR)  [address] [place] [address]    STAR ? (325) 800-STAR  Services provided: individual advocacy, information and referrals, group support, medical advocacy, legal assistance, counseling, and safety planning for survivors of sexual assault    [facility])  [address] [place] [address]  [facility] Forensic Program ? [phone]  Services provided: free forensic medical exams for sexual assault and domestic violence, which can be performed up to 5 days after an incident. It is not necessary to make a police report to receive a forensic medical exam    Legal  [facility]  [address] [address]  Project [facility] ? [phone]  Services provided: free emergency legal representation for survivors of doemstic violence residing in [facility]. Legal services may include temporary restraining orders, temporary child support, custody, and use of property    [facility] LEGAL SERVICES (LS)  [address] [address]  SLLS ? [phone]  Services provided: free legal representation for survivors of domestic violence residing in [facility]. Legal services may include temporary child support, custody, and divorce      HOTLINES:     [facility] DOMESTIC VIOLENCE HOTLINE  [phone]    Services provided: free and confidential hotline for victims and survivors of domestic violence. All calls will be routed to a domestic violence service provided in the  victim or survivor's area    NATIONAL HUMAN TRAFFICKING HOTLINE  (670) [phone]    Services provided: national anti-trafficking hotline serving victims and survivors of human trafficking. Provides information about local resources, and access to safe space to report tips, seek services, and ask for help    VIA LINK  211 or [phone] [phone]    Service provided: counselors can provide crisis counseling. Counselors can also provide information and referrals to programs which can help with needs such as food, shelter, medical care, financial assistance, mental health services, substance abuse treatment, senior services, childcare, and more      HOMELESS SHELTERS:      Homeless shelters  [facility]  Emergency shelter for individuals and families  [address] [facility]  [phone]  [facility]  Emergency shelter for men only  Meals daily 6am, 2pm, & 6pm  Clothing, case management M-F by appointment (ID/job/housing/legal assistance), mail  [address] [facility]  [phone]  [facility]  Emergency shelter for men  [address] Annika [facility]  [phone]  Emergency shelter for women  [address] [facility]  [phone]  Breakfast & lunch daily, dinner M-F  Case management, job counseling services   [facility]  Emergency shelter for teens and young adults up to 22yo  [address] [facility]  [phone]  [place]  Emergency shelter for women over 17yo and their kids  [address] [place] [address]  [phone]  [facility]  Day program, meals M-F 1PM (arrive early)  Showers, laundry, hygiene kits, showers, phones, , notary services, case management, ID assistance  [address] [facility]  [phone] M-F 8am-2:30pm  [facility]  Day program  MTWF 7:30am-3:30pm,  8:30am-3:30pm  Crisis intervention, employment assistance, food/clothing, hygiene kits, bus tokens, mail  [address] [facility]  [phone]  [facility]  Mobile outreach for homeless persons in  [facility]  [phone]  Healthcare for the Homeless  Primary healthcare, case management, dental services, TB placement  Call ahead  [address]  2nd Floor  [phone]  [facility]  Connects homeless people with their loved ones in other cities by providing transportation costs   [phone]      [place] RESOURCES:     [place] Mobile Mental Health Crisis Response Team:    Region 12 ([place], [place], [place], and [facility]) ([facility])  [phone]      Outpatient Mental Health & Addiction Clinic Resources for both [facility]:    [facility] Mental Healthcare Resources  Website: [email or web address]  Main Number: 816-117-5327    [facility] ([facility] Area)  [address] ([address]) [place] [address]  [phone]    [facility] ([facility])  [address] ([address] [facility]) [place] [address]  [phone]    [facility]  [address] ([address]) [place] [address]  [phone]    [facility]  [address] ([address] [facility] [place] [address]  [phone]      Addiction Treatment Resources for both [facility]:    [facility] (Detox, Residential, PHP, IOP, and Aftercare Programs)  [address] [place] [address]  [phone]    [facility] (Residential, IOP, Transitional Living, and Aftercare Programs)  [address] [place] [address]  [phone]    [facility] (Inpatient, Residential, Detox, IOP, Outpatient, and Aftercare Programs)  [address] [facility], [place] [address] [phone] or toll free at [phone]      Outpatient Mental Health Psychotherapy Resources for both [facility]:    Sherlyn Begum, [facility]  [address]  [place] [address]  [phone]  Specialties: Depression, Anxiety, and Life Transitions    Estella Flores, PhD  [address]  [place] [address]  [phone]  Specialties: Testing and Evaluation, Education and Learning Disabilities, and ADHD    Jo Thorne, [facility] [address] [place] [address]  [phone]  Specialties: Obsessive-Compulsive (OCD), Depression, and Relationship Issues    Serina Dodge [facility] [address] [facility] [place] [address]  [phone]  Specialties: Trauma & PTSD, Mood Disorders, and Anxiety    Serina Rodney, PhD, [facility]  [facility] [address] [place] [address]  [phone]  Specialties: Family Conflict, Child, and Relationship Issues    Angeli Agudelo [facility] Counseling [place] [address] [phone]  Specialties: Anxiety, Depression, and Anger Management        IN CASE OF SUICIDAL THINKING, call the National Suicide Hotline Number: 988    988 Suicide & Crisis Lifeline: 988 , 1-032-685-TALK (8255)  Provides 24/7, free and confidential support for people in distress, prevention and crisis resources for you or your loved ones, and best practices for professionals.    Call, text or chat.  https://988Swyft Medialine.org

## 2024-06-30 NOTE — ED NOTES
Patient belongings collected and labeled with patient name and MRN:    1 pair of black shoes  1 cell phone in a green case  1 pair of black pants  1 grey shirt

## 2024-07-15 ENCOUNTER — TELEPHONE (OUTPATIENT)
Dept: FAMILY MEDICINE | Facility: CLINIC | Age: 69
End: 2024-07-15
Payer: MEDICARE

## 2024-07-15 NOTE — TELEPHONE ENCOUNTER
Daughter is calling in regards to pt who lives at her residence and has a NP appt 7/18 @ 9 am. She wanted to discuss with you some information before visit. I told her I would send a message to the doctor and let her know. Daughter EDUARDO.

## 2024-07-15 NOTE — TELEPHONE ENCOUNTER
----- Message from Dolores Beth sent at 7/15/2024 10:04 AM CDT -----  Contact: daughter/Emmanuel  Type:  Patient Returning Call    Who Called:  daughter/emmanuel  Who Left Message for Patient:  Marie  Does the patient know what this is regarding?:  yes  Best Call Back Number:  [phone]

## 2024-07-15 NOTE — TELEPHONE ENCOUNTER
----- Message from Blayne Moses sent at 7/15/2024  8:54 AM CDT -----  Regarding: return call  Contact: Daughter: Serina  Type:  Patient Returning Call    Who Called:Daughter: Serina Boone  Who Left Message for Patient:office staff  Does the patient know what this is regarding?:questions about upcoming appointment/ new patient  Would the patient rather a call back or a response via [facility]?   Best Call Back Number:[phone]  Additional Information:  
Lt VM - to call office  
independent

## 2024-07-22 ENCOUNTER — OFFICE VISIT (OUTPATIENT)
Dept: FAMILY MEDICINE | Facility: CLINIC | Age: 69
End: 2024-07-22
Payer: MEDICARE

## 2024-07-22 VITALS
WEIGHT: 171.75 LBS | HEIGHT: 66 IN | HEART RATE: 64 BPM | BODY MASS INDEX: 27.6 KG/M2 | OXYGEN SATURATION: 95 % | TEMPERATURE: 98 F | DIASTOLIC BLOOD PRESSURE: 60 MMHG | SYSTOLIC BLOOD PRESSURE: 90 MMHG

## 2024-07-22 DIAGNOSIS — Z12.31 BREAST CANCER SCREENING BY MAMMOGRAM: ICD-10-CM

## 2024-07-22 DIAGNOSIS — R32 URINARY INCONTINENCE, UNSPECIFIED TYPE: ICD-10-CM

## 2024-07-22 DIAGNOSIS — F22 DELUSIONS: ICD-10-CM

## 2024-07-22 DIAGNOSIS — I50.9 CONGESTIVE HEART FAILURE, UNSPECIFIED HF CHRONICITY, UNSPECIFIED HEART FAILURE TYPE: ICD-10-CM

## 2024-07-22 DIAGNOSIS — Z23 IMMUNIZATION DUE: ICD-10-CM

## 2024-07-22 DIAGNOSIS — M25.562 ACUTE PAIN OF LEFT KNEE: ICD-10-CM

## 2024-07-22 DIAGNOSIS — I48.0 PAROXYSMAL ATRIAL FIBRILLATION: ICD-10-CM

## 2024-07-22 DIAGNOSIS — D84.821 DRUG-INDUCED IMMUNODEFICIENCY: ICD-10-CM

## 2024-07-22 DIAGNOSIS — Z79.899 DRUG-INDUCED IMMUNODEFICIENCY: ICD-10-CM

## 2024-07-22 DIAGNOSIS — Z76.89 ENCOUNTER TO ESTABLISH CARE WITH NEW DOCTOR: Primary | ICD-10-CM

## 2024-07-22 DIAGNOSIS — E11.40 TYPE 2 DIABETES MELLITUS WITH DIABETIC NEUROPATHY, WITHOUT LONG-TERM CURRENT USE OF INSULIN: ICD-10-CM

## 2024-07-22 DIAGNOSIS — Z09 HOSPITAL DISCHARGE FOLLOW-UP: ICD-10-CM

## 2024-07-22 DIAGNOSIS — L40.50 PSORIATIC ARTHRITIS: ICD-10-CM

## 2024-07-22 PROCEDURE — 99999 PR PBB SHADOW E&M-EST. PATIENT-LVL IV: CPT | Mod: PBBFAC,HCNC,, | Performed by: STUDENT IN AN ORGANIZED HEALTH CARE EDUCATION/TRAINING PROGRAM

## 2024-07-22 PROCEDURE — 3288F FALL RISK ASSESSMENT DOCD: CPT | Mod: HCNC,CPTII,S$GLB, | Performed by: STUDENT IN AN ORGANIZED HEALTH CARE EDUCATION/TRAINING PROGRAM

## 2024-07-22 PROCEDURE — 1101F PT FALLS ASSESS-DOCD LE1/YR: CPT | Mod: HCNC,CPTII,S$GLB, | Performed by: STUDENT IN AN ORGANIZED HEALTH CARE EDUCATION/TRAINING PROGRAM

## 2024-07-22 PROCEDURE — 3074F SYST BP LT 130 MM HG: CPT | Mod: HCNC,CPTII,S$GLB, | Performed by: STUDENT IN AN ORGANIZED HEALTH CARE EDUCATION/TRAINING PROGRAM

## 2024-07-22 PROCEDURE — 1125F AMNT PAIN NOTED PAIN PRSNT: CPT | Mod: HCNC,CPTII,S$GLB, | Performed by: STUDENT IN AN ORGANIZED HEALTH CARE EDUCATION/TRAINING PROGRAM

## 2024-07-22 PROCEDURE — 3078F DIAST BP <80 MM HG: CPT | Mod: HCNC,CPTII,S$GLB, | Performed by: STUDENT IN AN ORGANIZED HEALTH CARE EDUCATION/TRAINING PROGRAM

## 2024-07-22 PROCEDURE — 4010F ACE/ARB THERAPY RXD/TAKEN: CPT | Mod: HCNC,CPTII,S$GLB, | Performed by: STUDENT IN AN ORGANIZED HEALTH CARE EDUCATION/TRAINING PROGRAM

## 2024-07-22 PROCEDURE — 1159F MED LIST DOCD IN RCRD: CPT | Mod: HCNC,CPTII,S$GLB, | Performed by: STUDENT IN AN ORGANIZED HEALTH CARE EDUCATION/TRAINING PROGRAM

## 2024-07-22 PROCEDURE — 3008F BODY MASS INDEX DOCD: CPT | Mod: HCNC,CPTII,S$GLB, | Performed by: STUDENT IN AN ORGANIZED HEALTH CARE EDUCATION/TRAINING PROGRAM

## 2024-07-22 PROCEDURE — 99204 OFFICE O/P NEW MOD 45 MIN: CPT | Mod: HCNC,S$GLB,, | Performed by: STUDENT IN AN ORGANIZED HEALTH CARE EDUCATION/TRAINING PROGRAM

## 2024-07-22 RX ORDER — ASPIRIN 325 MG
1 TABLET ORAL DAILY
COMMUNITY

## 2024-07-22 RX ORDER — ATORVASTATIN CALCIUM 40 MG/1
40 TABLET, FILM COATED ORAL NIGHTLY
COMMUNITY
Start: 2024-06-30 | End: 2024-08-01 | Stop reason: SDUPTHER

## 2024-07-22 RX ORDER — ARIPIPRAZOLE 5 MG/1
5 TABLET ORAL DAILY
COMMUNITY
Start: 2024-06-28 | End: 2024-08-01 | Stop reason: SDUPTHER

## 2024-07-22 RX ORDER — MIRABEGRON 50 MG/1
1 TABLET, EXTENDED RELEASE ORAL DAILY
COMMUNITY
Start: 2024-07-02 | End: 2024-08-01

## 2024-07-22 RX ORDER — LOSARTAN POTASSIUM 25 MG/1
25 TABLET ORAL
COMMUNITY
Start: 2024-07-07 | End: 2024-08-01

## 2024-07-22 RX ORDER — SULFASALAZINE 500 MG/1
TABLET, DELAYED RELEASE ORAL
COMMUNITY
Start: 2024-05-28 | End: 2024-08-01 | Stop reason: SINTOL

## 2024-07-22 RX ORDER — ISOSORBIDE MONONITRATE 30 MG/1
1 TABLET, EXTENDED RELEASE ORAL EVERY MORNING
COMMUNITY
Start: 2024-05-14

## 2024-07-22 RX ORDER — LOPERAMIDE HYDROCHLORIDE 2 MG/1
1 CAPSULE ORAL
COMMUNITY

## 2024-07-22 RX ORDER — GABAPENTIN 300 MG/1
300 CAPSULE ORAL 3 TIMES DAILY
COMMUNITY

## 2024-07-22 RX ORDER — IPRATROPIUM BROMIDE 21 UG/1
2 SPRAY, METERED NASAL 2 TIMES DAILY
COMMUNITY

## 2024-07-22 RX ORDER — BLOOD-GLUCOSE METER
EACH MISCELLANEOUS
COMMUNITY
Start: 2024-01-09

## 2024-07-22 RX ORDER — DEXTROSE 4 G
TABLET,CHEWABLE ORAL
COMMUNITY
Start: 2024-01-09

## 2024-07-22 RX ORDER — LANCETS
EACH MISCELLANEOUS 2 TIMES DAILY
COMMUNITY
Start: 2024-01-09

## 2024-07-22 RX ORDER — ALBUTEROL SULFATE 90 UG/1
2 AEROSOL, METERED RESPIRATORY (INHALATION) EVERY 4 HOURS PRN
COMMUNITY

## 2024-07-22 RX ORDER — LOSARTAN POTASSIUM 50 MG/1
50 TABLET ORAL DAILY
COMMUNITY
Start: 2024-07-09

## 2024-07-22 RX ORDER — FUROSEMIDE 40 MG/1
TABLET ORAL
COMMUNITY
Start: 2024-01-11

## 2024-07-22 RX ORDER — SULFASALAZINE 500 MG/1
TABLET ORAL
COMMUNITY
Start: 2024-07-07 | End: 2024-08-01 | Stop reason: SINTOL

## 2024-07-22 NOTE — Clinical Note
April 2021 polyps on colonoscopy maybe one with precancerous pathology - at [facility]

## 2024-07-22 NOTE — PROGRESS NOTES
Subjective:       Patient ID: Chloe Dolan is a 68 y.o. female.    Chief Complaint: Establish Care (Recent hosp stay at [facility])    68 year old female presents to establish care after recent hospital discharge. She is here with her daughter who contributes to the history. Her doctor of 20 years recently retired - they were family practice at [facility]. She recently moved to the [facility] to live with her daughter.    She had a bone density test two years ago and it showed lucencies related to psoriatic arthritis - she follows with rheumatology and most recently saw Dr. Mcnair at [facility] 3-4 weeks ago. He started a medication that may be making her constipated and sulfasalazine was started about a month ago. She was on Fosamax but that was stopped and she is not sure why. She is overdue for colonoscopy and recently had to cancel it.    Her daughter adds that the patient will see Dr. Mott with neurology at the end of the month for a cognitive test. The patient moved to the [facility] after an episode. The patient has given money away to someone online believing they are a celebrity and that the two of them are in a relationship. The patient admits that she did give money away and was lonely.    The patient has been having constipation and left leg pain. There is a cough that comes up with some phlegm sometimes. We will have the patient come back next week and talk about physical therapy. Her daughter will bring her home medications by or send them through the portal along with the most recent hospital discharge summary as no medication list or hospital records are available. Our  updates me that the recent hospitalization records are not available and so must not have taken pace at [facility] [place] thought.    It does look like the patient presented to the [facility] [place] on 7/8/24 when brought by the 's department for a psychiatric evaluation and was ultimately discharged home.  In that visit the patient admitted that now she knows she was scammed and she was talking to new people who might be scamming her but she did not care and did not want to be controlled by her family. She was recently discharged from [facility] in the weeks prior after arguing with her other daughter. It was mentioned that the patient was on zoloft and had been started on abilify but the patient did not think she needed to take that because she was not suicidal or homicidal. She came to live with this daughter because she was in a disagreement with the other. The history reads that the patient only said she was going to kill herself, because she was mad. That ER provider called and spoke with the patient's daughter, the patient's friend, and a psychiatrist. It also sounds like the patient is  and anticipating a divorce and that the relationship she had with the [employer] celebrity and giving money away has come between her and her soon to be ex- and daughters including the one with whom she now lives. There is a reported restraining order against the patient placed by her soon-to-be/ex? .        Past Medical History:   Diagnosis Date    Diabetes mellitus     Heart disease     Hyperlipidemia     Hypertension        Past Surgical History:   Procedure Laterality Date    ARTERIAL BYPASS SURGRY      back surgey      CHOLECYSTECTOMY      TOTAL KNEE ARTHROPLASTY      WISDOM TOOTH EXTRACTION         Review of patient's allergies indicates:   Allergen Reactions    Adhesive Itching       Social History     Socioeconomic History    Marital status:    Tobacco Use    Smoking status: Former     Current packs/day: 1.00     Types: Cigarettes   Substance and Sexual Activity    Alcohol use: No    Drug use: No    Sexual activity: Yes     Partners: Male     Comment:      Social Determinants of Health     Financial Resource Strain: Medium Risk (7/15/2024)    Overall Financial Resource  Strain (CARDIA)     Difficulty of Paying Living Expenses: Somewhat hard   Food Insecurity: Food Insecurity Present (7/15/2024)    Hunger Vital Sign     Worried About Running Out of Food in the Last Year: Sometimes true     Ran Out of Food in the Last Year: Never true   Transportation Needs: No Transportation Needs (2/27/2024)    Received from [facility] - Transportation     Lack of Transportation (Medical): No     Lack of Transportation (Non-Medical): No   Physical Activity: Inactive (7/15/2024)    Exercise Vital Sign     Days of Exercise per Week: 0 days     Minutes of Exercise per Session: 0 min   Stress: Stress Concern Present (7/15/2024)    [employer] - Occupational Stress Questionnaire     Feeling of Stress : Rather much   Housing Stability: Unknown (7/15/2024)    Housing Stability Vital Sign     Unable to Pay for Housing in the Last Year: No       Current Outpatient Medications on File Prior to Visit   Medication Sig Dispense Refill    ARIPiprazole (ABILIFY) 5 MG Tab Take 5 mg by mouth once daily.      atorvastatin (LIPITOR) 40 MG tablet Take 40 mg by mouth every evening.      blood glucose control, low (TRUE METRIX LEVEL 1) Soln use as directed      blood sugar diagnostic Strp 1 strip 2 (two) times a day.      blood-glucose meter Misc Use daily      furosemide (LASIX) 40 MG tablet TAKE 1 TABLET BY MOUTH DAILY AS NEEDED FOR SWELLING      gabapentin (NEURONTIN) 300 MG capsule Take 300 mg by mouth 3 (three) times daily.      ipratropium (ATROVENT) 21 mcg (0.03 %) nasal spray 2 sprays by Each Nostril route 2 (two) times daily.      isosorbide mononitrate (IMDUR) 30 MG 24 hr tablet Take 1 tablet by mouth every morning.      lancets Misc 2 (two) times daily.      loperamide (IMODIUM) 2 mg capsule Take 1 capsule by mouth as needed.      losartan (COZAAR) 25 MG tablet Take 25 mg by mouth.      losartan (COZAAR) 50 MG tablet Take 1 tablet by mouth 2 (two) times daily.       "metoprolol succinate (TOPROL-XL) 50 MG 24 hr tablet 50 mg 2 (two) times daily.  3    mirabegron (MYRBETRIQ) 50 mg Tb24 Take 1 tablet by mouth once daily.      sertraline (ZOLOFT) 100 MG tablet Take 125 mg by mouth once daily.  5    sulfaSALAzine (AZULFIDINE) 500 MG EC tablet Take by mouth.      albuterol (PROVENTIL/VENTOLIN HFA) 90 mcg/actuation inhaler Inhale 2 puffs into the lungs every 4 (four) hours as needed.      aspirin 325 MG tablet Take 1 tablet by mouth once daily.      sulfaSALAzine (AZULFIDINE) 500 mg Tab Take by mouth.       No current facility-administered medications on file prior to visit.       Family History   Problem Relation Name Age of Onset    Breast cancer Cousin         Review of Systems    Objective:      BP 90/60   Pulse 64   Temp 98.4 °F (36.9 °C) (Oral)   Ht 5' 5.5" (1.664 m)   Wt 77.9 kg (171 lb 11.8 oz)   SpO2 95%   BMI 28.14 kg/m²   Physical Exam  Constitutional:       General: She is not in acute distress.     Appearance: Normal appearance. She is not ill-appearing, toxic-appearing or diaphoretic.   HENT:      Mouth/Throat:      Mouth: Mucous membranes are moist.   Eyes:      Conjunctiva/sclera: Conjunctivae normal.   Cardiovascular:      Rate and Rhythm: Normal rate and regular rhythm.   Pulmonary:      Effort: Pulmonary effort is normal.      Breath sounds: Normal breath sounds.   Abdominal:      General: Bowel sounds are normal.      Palpations: Abdomen is soft.   Skin:     General: Skin is warm and dry.      Findings: No lesion.   Neurological:      General: No focal deficit present.      Mental Status: She is alert.   Psychiatric:         Mood and Affect: Mood normal.         Behavior: Behavior normal.         Assessment:       1. Encounter to establish care with new doctor    2. Hospital discharge follow-up    3. Delusions    4. Paroxysmal atrial fibrillation    5. Type 2 diabetes mellitus with diabetic neuropathy, without long-term current use of insulin    6. Congestive " heart failure, unspecified HF chronicity, unspecified heart failure type    7. Psoriatic arthritis    8. Drug-induced immunodeficiency    9. Acute pain of left knee    10. Urinary incontinence, unspecified type    11. Breast cancer screening by mammogram    12. Immunization due        Plan:       Encounter to establish care with new doctor    Hospital discharge follow-up  - No discharge summary available for review and they are not sure where she was discharged from - maybe [facility] per ER note from 7[facility] at [facility]?    Delusions  -     Ambulatory referral/consult to Neurology; Future; Expected date: 07/29/2024  -     Ambulatory referral/consult to Psychiatry; Future; Expected date: 07/29/2024  -     Ambulatory referral/consult to Psychiatry; Future; Expected date: 07/29/2024    Paroxysmal atrial fibrillation  -     Ambulatory referral/consult to Cardiology; Future; Expected date: 07/29/2024    Type 2 diabetes mellitus with diabetic neuropathy, without long-term current use of insulin  - Labs from 6/29 with BG of 138.    Congestive heart failure, unspecified HF chronicity, unspecified heart failure type  -     Ambulatory referral/consult to Cardiology; Future; Expected date: 07/29/2024  - BNP elevated at 114 on 6/29/24. Euvolemic on exam, today.    Psoriatic arthritis  -     Ambulatory referral/consult to Rheumatology; Future; Expected date: 07/29/2024  - [place] like to be referred to [facility] despite known two year wait list.    Drug-induced immunodeficiency  - Will update me with current medication list left at home.    Acute pain of left knee  - Will discuss referral to physical therapy on follow up in one week. No increased warmth or exquisite tenderness, today.    Urinary incontinence, unspecified type  -     Ambulatory referral/consult to Urology; Future; Expected date: 07/29/2024    Breast cancer screening by mammogram  - Discuss on follow  up.    Immunization due  - Discuss on follow up.      Patient will return in one week so that we can review current medications and recent hospital discharge documentation which is not available at this time.

## 2024-07-23 ENCOUNTER — TELEPHONE (OUTPATIENT)
Dept: NEUROLOGY | Facility: CLINIC | Age: 69
End: 2024-07-23
Payer: MEDICARE

## 2024-07-23 ENCOUNTER — PATIENT OUTREACH (OUTPATIENT)
Dept: PSYCHIATRY | Facility: CLINIC | Age: 69
End: 2024-07-23
Payer: MEDICARE

## 2024-07-23 ENCOUNTER — PATIENT MESSAGE (OUTPATIENT)
Dept: PSYCHIATRY | Facility: CLINIC | Age: 69
End: 2024-07-23
Payer: MEDICARE

## 2024-07-23 NOTE — TELEPHONE ENCOUNTER
"contacted pts daughter to get clarification on "delusions" says the pt got scammed for thousands of dollars and continued to give money even after being told was a scam. Says dhe also suffers from memory issues and PCP told pt she may have dementia. Told pts daughter I would call back with  availability for new memory.     "

## 2024-07-24 ENCOUNTER — PATIENT OUTREACH (OUTPATIENT)
Dept: ADMINISTRATIVE | Facility: HOSPITAL | Age: 69
End: 2024-07-24
Payer: MEDICARE

## 2024-07-24 ENCOUNTER — TELEPHONE (OUTPATIENT)
Dept: NEUROLOGY | Facility: CLINIC | Age: 69
End: 2024-07-24
Payer: MEDICARE

## 2024-07-24 NOTE — PROGRESS NOTES
Population Health Chart Review & Patient Outreach Details      Additional Pop Health Notes:               Updates Requested / Reviewed:      Updated Care Coordination Note         Health Maintenance Topics Overdue:      VBHM Score: 3     Colon Cancer Screening  Osteoporosis Screening  Mammogram    Pneumonia Vaccine  RSV Vaccine                  Health Maintenance Topic(s) Outreach Outcomes & Actions Taken:    Colorectal Cancer Screening - Outreach Outcomes & Actions Taken  : External Records Requested & Care Team Updated if Applicable

## 2024-07-24 NOTE — TELEPHONE ENCOUNTER
----- Message from Griffin Polo sent at 7/24/2024  3:30 PM CDT -----  Regarding: ret call  Contact: daughter at [phone]  Type:  Patient Returning Call    Who Called:  daughter / emmanuel    Who Left Message for Patient:  office    Does the patient know what this is regarding?:  yes    Best Call Back Number:  [phone]    Additional Information:  daughter states pt is having cognitave test at non [facility] facility in a few weeks and this appt is for f/u after that test.

## 2024-07-24 NOTE — LETTER
AUTHORIZATION FOR RELEASE OF   CONFIDENTIAL INFORMATION    Dear [facility],    We are seeing Chloe Dolan, date of birth 1955, in the clinic at [facility]. Floresita Dominguez DO is the patient's PCP. Chloe Dolan has an outstanding lab/procedure at the time we reviewed her chart. In order to help keep her health information updated, she has authorized us to request the following medical record(s):                                   ( X )  COLONOSCOPY           Please fax records to [facility], McConnell, Sara A., DO, [phone]    Gayathri Mueller Care Coordinator  [facility]  Phone: [phone]        Patient Name: Chloe Dolan  : 1955  Patient Phone #: [phone]            Chloe Dolan  MRN: [id number]  : 1955  Age: 68 y.o.  Sex: female         Patient/Legal Guardian Signature  This signature was collected at 07/15/2024           _______________________________   Printed Name/Relationship to Patient      Consent for Examination and Treatment: I hereby authorize the providers and employees of [facility] ([facility]) to provide medical treatment/services which includes, but is not limited to, performing and administering tests and diagnostic procedures that are deemed necessary, including, but not limited to, imaging examinations, blood tests and other laboratory procedures as may be required by the hospital, clinic, or may be ordered by my physician(s) or persons working under the general and/or special instructions of my physician(s).      I understand and agree that this consent covers all authorized persons, including but not limited to physicians, residents, nurse practitioners, physicians' assistants, specialists, consultants, student nurses, and independently contracted physicians, who are called upon by the physician in charge, to carry out the diagnostic procedures and medical or surgical treatment.     I hereby authorize  [facility] to retain or dispose of any specimens or tissue, should there be such remaining from any test or procedure.     I hereby authorize and give consent for [facility] providers and employees to take photographs, images or videotapes of such diagnostic, surgical or treatment procedures of Patient as may be required by [facility] or as may be ordered by a physician. I further acknowledge and agree that [facility] may use cameras or other devices for patient monitoring.     I am aware that the practice of medicine is not an exact science, and I acknowledge that no guarantees have been made to me as to the outcome of any tests, procedures or treatment.     Authorization for Release of Information: I understand that my insurance company and/or their agents may need information necessary to make determinations about payment/reimbursement. I hereby provide authorization to release to all insurance companies, their successors, assignees, other parties with whom they may have contracted, or others acting on their behalf, that are involved with payment for any hospital and/or clinic charges incurred by the patient, any information that they request and deem necessary for payment/reimbursement, and/or quality review.  I further authorize the release of my health information to physicians or other health care practitioners on staff who are involved in my health care now and in the future, and to other health care providers, entities, or institutions for the purpose of my continued care and treatment, including referrals.     REGISTRATION AUTHORIZATION  Form No. 73068 (Rev. 3/25/2024)    Page 1 of 3                       Medicare Patient's Certification and Authorization to Release Information and Payment Request:  I certify that the information given by me in applying for payment under Title XVIII of the Social Security Act is correct. I authorize any medina of medical or other information about me to release to the [facility], or its intermediaries or carriers, any information needed for this or a related Medicare claim. I request that payment of authorized benefits be made on my behalf.     Assignment of Insurance Benefits:   I hereby authorize any and all insurance companies, health plans, defined   benefit plans, health insurers or any entity that is or may be responsible for payment of my medical expenses to pay all hospital and medical benefits now due, and to become due and payable to me under any hospital benefits, sick benefits, injury benefits or any other benefit for services rendered to me, including Major Medical Benefits, direct to [facility] and all independently contracted physicians. I assign any and all rights that I may have against any and all insurance companies, health plans, defined benefit plans, health insurers or any entity that is or may be responsible for payment of my medical expenses, including, but not limited to any right to appeal a denial of a claim, any right to bring any action, lawsuit, administrative proceeding, or other cause of action on my behalf. I specifically assign my right to pursue litigation against any and all insurance companies, health plans, defined benefit plans, health insurers or any entity that is or may be responsible for payment of my medical expenses based upon a refusal to pay charges.            E. Valuables: It is understood and agreed that [facility] is not liable for the damage to or loss of any money, jewelry,   documents, dentures, eye glasses, hearing aids, prosthetics, or other property of value.     F. Computer Equipment: I understand and agree that should I choose to use computer equipment owned by [facility] or if I choose to access the Internet via [facility]s network, I do so at my own risk. [facility] is not responsible for any damage to my computer equipment or to any damages of any type that might arise from my loss of equipment or data.     G. Acceptance  of Financial Responsibility:  I agree that in consideration of the services and   supplies that have been   or will be furnished to the patient, I am hereby obligated to pay all charges made for or on the account of the patient according to the standard rates (in effect at the time the services and supplies are delivered) established by [facility], including its Patient Financial Assistance Policy to the extent it is applicable. I understand that I am responsible for all charges, or portions thereof, not covered by insurance or other sources. Patient refunds will be distributed only after balances at all [facility] facilities are paid.     H. Communication Authorization:  I hereby authorize [facility] and its representatives, along with any billing service   or  who may work on their behalf, to contact me on   my cell phone and/or home phone using pre- recorded messages, artificial voice messages, automatic telephone dialing devices or other computer assisted technology, or by electronic      mail, text messaging, or by any other form of electronic communication. This includes, but is not limited to, appointment reminders, yearly physical exam reminders, preventive care reminders, patient campaigns, welcome calls, and calls about account balances on my account or any account on which I am listed as a guarantor. I understand I have the right to opt out of these communications at any time.      Relationship  Between  Facility and  Provider:      I understand that some, but not all, providers furnishing services to the patient are not employees or agents of [facility]. The patient is under the care and supervision of his/her attending physician, and it is the responsibility of the facility and its nursing staff to carry out the instructions of such physicians. It is the responsibility of the patient's physician/designee to obtain the patient's informed consent, when required, for medical or surgical treatment,  special diagnostic or therapeutic procedures, or hospital services rendered for the patient under the special instructions of the physician/designee.           REGISTRATION AUTHORIZATION  Form No. 10652 (Rev. 3/25/2024)    Page 2 of 3                       Immunizations: [facility] shares immunization information with state sponsored health departments to help you and your doctor keep track of your immunization records. By signing, you consent to have this information shared with the health department in your state:                                [place] - LINKS ([place] Immunization Network for Kids Statewide)                                [place] - MIIX ([place] Immunization Information eXchange)                                [place] - ImmPRINT (Immunization Patient Registry with Integrated Technology)     TERM: This authorization is valid for this and subsequent care/treatment I receive at [facility] and will remain valid unless/until revoked in writing by me.     [facility]: As used in this document, [facility] means all [facility] owned and managed facilities, including, but not limited to, all health centers, surgery centers, clinics, urgent care centers, and hospitals.         [facility] complies with applicable Federal civil rights laws and does not discriminate on the basis of race, color, national origin, age, disability, or sex.  ATENCIÓN: si habla español, tiene a toledo disposición servicios gratuitos de asistencia lingüística. Andrea mosqueda [phone].  CHÚ Ý: N?u b?n nói Ti?ng Vi?t, có các d?ch v? h? tr? ngôn ng? mi?n phí dành cho b?n. G?i s? [phone].        REGISTRATION AUTHORIZATION  Form No. 83065 (Rev. 3/25/2024)   Page 3 of 3

## 2024-07-29 ENCOUNTER — PATIENT MESSAGE (OUTPATIENT)
Dept: FAMILY MEDICINE | Facility: CLINIC | Age: 69
End: 2024-07-29
Payer: MEDICARE

## 2024-07-30 ENCOUNTER — PATIENT MESSAGE (OUTPATIENT)
Dept: FAMILY MEDICINE | Facility: CLINIC | Age: 69
End: 2024-07-30
Payer: MEDICARE

## 2024-07-30 DIAGNOSIS — Z00.00 ENCOUNTER FOR MEDICARE ANNUAL WELLNESS EXAM: ICD-10-CM

## 2024-07-31 PROBLEM — F32.A DEPRESSION: Status: ACTIVE | Noted: 2022-08-08

## 2024-07-31 PROBLEM — F41.9 ANXIETY: Status: ACTIVE | Noted: 2024-07-31

## 2024-07-31 PROBLEM — M19.90 OSTEOARTHRITIS: Status: ACTIVE | Noted: 2024-07-31

## 2024-07-31 PROBLEM — K21.9 GASTROESOPHAGEAL REFLUX DISEASE: Status: ACTIVE | Noted: 2024-07-31

## 2024-07-31 PROBLEM — L40.50 PSORIATIC ARTHRITIS: Status: ACTIVE | Noted: 2024-07-31

## 2024-07-31 PROBLEM — R55 SYNCOPE: Status: RESOLVED | Noted: 2023-08-25 | Resolved: 2024-07-31

## 2024-07-31 PROBLEM — R06.09 DYSPNEA ON EXERTION: Status: RESOLVED | Noted: 2023-10-10 | Resolved: 2024-07-31

## 2024-07-31 PROBLEM — Z95.1 HISTORY OF CORONARY ARTERY BYPASS GRAFT: Status: RESOLVED | Noted: 2024-07-31 | Resolved: 2024-07-31

## 2024-07-31 PROBLEM — I25.10 CAD S/P PERCUTANEOUS CORONARY ANGIOPLASTY: Status: ACTIVE | Noted: 2022-03-11

## 2024-07-31 PROBLEM — I25.9 CHRONIC ISCHEMIC HEART DISEASE: Status: ACTIVE | Noted: 2024-07-31

## 2024-07-31 PROBLEM — I10 HYPERTENSION: Status: ACTIVE | Noted: 2023-08-25

## 2024-07-31 PROBLEM — B37.2 CUTANEOUS CANDIDIASIS: Status: RESOLVED | Noted: 2024-02-19 | Resolved: 2024-07-31

## 2024-07-31 PROBLEM — K57.90 DIVERTICULAR DISEASE: Status: RESOLVED | Noted: 2024-07-31 | Resolved: 2024-07-31

## 2024-07-31 PROBLEM — M25.562 ACUTE PAIN OF LEFT KNEE: Status: ACTIVE | Noted: 2024-07-31

## 2024-07-31 PROBLEM — R32 URINARY INCONTINENCE: Status: ACTIVE | Noted: 2024-07-31

## 2024-07-31 PROBLEM — R91.8 MULTIPLE PULMONARY NODULES: Status: ACTIVE | Noted: 2024-07-31

## 2024-07-31 PROBLEM — B25.9 CYTOMEGALOVIRUS INFECTION: Status: RESOLVED | Noted: 2024-07-31 | Resolved: 2024-07-31

## 2024-07-31 PROBLEM — M51.369 DDD (DEGENERATIVE DISC DISEASE), LUMBAR: Status: ACTIVE | Noted: 2022-03-11

## 2024-07-31 PROBLEM — R60.9 EDEMA: Status: RESOLVED | Noted: 2023-12-19 | Resolved: 2024-07-31

## 2024-07-31 PROBLEM — S32.009A FRACTURE OF LUMBAR VERTEBRA: Status: ACTIVE | Noted: 2024-07-31

## 2024-07-31 PROBLEM — J30.1 NON-SEASONAL ALLERGIC RHINITIS DUE TO POLLEN: Status: ACTIVE | Noted: 2023-01-31

## 2024-07-31 PROBLEM — J01.90 ACUTE BACTERIAL SINUSITIS: Status: RESOLVED | Noted: 2024-07-31 | Resolved: 2024-07-31

## 2024-07-31 PROBLEM — M06.9 RHEUMATOID ARTHRITIS: Status: ACTIVE | Noted: 2022-08-08

## 2024-07-31 PROBLEM — E11.65 UNCONTROLLED TYPE 2 DIABETES MELLITUS WITH HYPERGLYCEMIA: Status: ACTIVE | Noted: 2024-01-26

## 2024-07-31 PROBLEM — Z98.61 CAD S/P PERCUTANEOUS CORONARY ANGIOPLASTY: Status: ACTIVE | Noted: 2022-03-11

## 2024-07-31 PROBLEM — M51.36 DDD (DEGENERATIVE DISC DISEASE), LUMBAR: Status: ACTIVE | Noted: 2022-03-11

## 2024-07-31 PROBLEM — G62.9 NEUROPATHY: Status: ACTIVE | Noted: 2022-12-09

## 2024-07-31 PROBLEM — G47.00 INSOMNIA: Status: ACTIVE | Noted: 2024-07-31

## 2024-07-31 PROBLEM — G72.9 MYOPATHY: Status: ACTIVE | Noted: 2024-02-19

## 2024-07-31 PROBLEM — I25.810 CORONARY ATHEROSCLEROSIS OF AUTOLOGOUS BYPASS GRAFT: Status: ACTIVE | Noted: 2024-07-31

## 2024-07-31 PROBLEM — F22 DELUSIONS: Status: ACTIVE | Noted: 2024-07-31

## 2024-07-31 PROBLEM — I25.10 ARTERIOSCLEROSIS OF CORONARY ARTERY: Status: ACTIVE | Noted: 2024-07-31

## 2024-07-31 PROBLEM — B96.89 ACUTE BACTERIAL SINUSITIS: Status: RESOLVED | Noted: 2024-07-31 | Resolved: 2024-07-31

## 2024-08-01 ENCOUNTER — HOSPITAL ENCOUNTER (OUTPATIENT)
Dept: RADIOLOGY | Facility: HOSPITAL | Age: 69
Discharge: HOME | End: 2024-08-01
Attending: STUDENT IN AN ORGANIZED HEALTH CARE EDUCATION/TRAINING PROGRAM
Payer: MEDICARE

## 2024-08-01 ENCOUNTER — PATIENT MESSAGE (OUTPATIENT)
Dept: FAMILY MEDICINE | Facility: CLINIC | Age: 69
End: 2024-08-01

## 2024-08-01 ENCOUNTER — OFFICE VISIT (OUTPATIENT)
Dept: FAMILY MEDICINE | Facility: CLINIC | Age: 69
End: 2024-08-01
Payer: MEDICARE

## 2024-08-01 VITALS
HEIGHT: 66 IN | TEMPERATURE: 99 F | DIASTOLIC BLOOD PRESSURE: 72 MMHG | BODY MASS INDEX: 27.74 KG/M2 | SYSTOLIC BLOOD PRESSURE: 116 MMHG | OXYGEN SATURATION: 96 % | HEART RATE: 76 BPM | WEIGHT: 172.63 LBS

## 2024-08-01 DIAGNOSIS — F22 DELUSIONS: ICD-10-CM

## 2024-08-01 DIAGNOSIS — K64.8 INTERNAL HEMORRHOIDS: ICD-10-CM

## 2024-08-01 DIAGNOSIS — K63.5 POLYP OF ASCENDING COLON, UNSPECIFIED TYPE: ICD-10-CM

## 2024-08-01 DIAGNOSIS — M79.89 LEG SWELLING: ICD-10-CM

## 2024-08-01 DIAGNOSIS — J01.00 ACUTE MAXILLARY SINUSITIS, RECURRENCE NOT SPECIFIED: ICD-10-CM

## 2024-08-01 DIAGNOSIS — R05.8 PRODUCTIVE COUGH: ICD-10-CM

## 2024-08-01 DIAGNOSIS — K63.5 POLYP OF SIGMOID COLON, UNSPECIFIED TYPE: ICD-10-CM

## 2024-08-01 DIAGNOSIS — H93.92 EAR PROBLEM, LEFT: ICD-10-CM

## 2024-08-01 DIAGNOSIS — T50.905A ADVERSE EFFECT OF DRUG, INITIAL ENCOUNTER: ICD-10-CM

## 2024-08-01 DIAGNOSIS — K57.90 DIVERTICULOSIS: ICD-10-CM

## 2024-08-01 DIAGNOSIS — Z98.61 CAD S/P PERCUTANEOUS CORONARY ANGIOPLASTY: ICD-10-CM

## 2024-08-01 DIAGNOSIS — R09.89 RESPIRATORY CRACKLES: ICD-10-CM

## 2024-08-01 DIAGNOSIS — I25.10 CAD S/P PERCUTANEOUS CORONARY ANGIOPLASTY: ICD-10-CM

## 2024-08-01 DIAGNOSIS — S31.809A OPEN WOUND OF BUTTOCK WITH COMPLICATION, UNSPECIFIED LATERALITY, INITIAL ENCOUNTER: Primary | ICD-10-CM

## 2024-08-01 PROCEDURE — 99214 OFFICE O/P EST MOD 30 MIN: CPT | Mod: HCNC,S$GLB,, | Performed by: STUDENT IN AN ORGANIZED HEALTH CARE EDUCATION/TRAINING PROGRAM

## 2024-08-01 PROCEDURE — 71046 X-RAY EXAM CHEST 2 VIEWS: CPT | Mod: 26,HCNC,, | Performed by: RADIOLOGY

## 2024-08-01 PROCEDURE — 3078F DIAST BP <80 MM HG: CPT | Mod: HCNC,CPTII,S$GLB, | Performed by: STUDENT IN AN ORGANIZED HEALTH CARE EDUCATION/TRAINING PROGRAM

## 2024-08-01 PROCEDURE — 3074F SYST BP LT 130 MM HG: CPT | Mod: HCNC,CPTII,S$GLB, | Performed by: STUDENT IN AN ORGANIZED HEALTH CARE EDUCATION/TRAINING PROGRAM

## 2024-08-01 PROCEDURE — 1159F MED LIST DOCD IN RCRD: CPT | Mod: HCNC,CPTII,S$GLB, | Performed by: STUDENT IN AN ORGANIZED HEALTH CARE EDUCATION/TRAINING PROGRAM

## 2024-08-01 PROCEDURE — 99999 PR PBB SHADOW E&M-EST. PATIENT-LVL V: CPT | Mod: PBBFAC,HCNC,, | Performed by: STUDENT IN AN ORGANIZED HEALTH CARE EDUCATION/TRAINING PROGRAM

## 2024-08-01 PROCEDURE — 71046 X-RAY EXAM CHEST 2 VIEWS: CPT | Mod: TC,HCNC,FY,PO

## 2024-08-01 PROCEDURE — 3008F BODY MASS INDEX DOCD: CPT | Mod: HCNC,CPTII,S$GLB, | Performed by: STUDENT IN AN ORGANIZED HEALTH CARE EDUCATION/TRAINING PROGRAM

## 2024-08-01 PROCEDURE — 3288F FALL RISK ASSESSMENT DOCD: CPT | Mod: HCNC,CPTII,S$GLB, | Performed by: STUDENT IN AN ORGANIZED HEALTH CARE EDUCATION/TRAINING PROGRAM

## 2024-08-01 PROCEDURE — 4010F ACE/ARB THERAPY RXD/TAKEN: CPT | Mod: HCNC,CPTII,S$GLB, | Performed by: STUDENT IN AN ORGANIZED HEALTH CARE EDUCATION/TRAINING PROGRAM

## 2024-08-01 PROCEDURE — 1101F PT FALLS ASSESS-DOCD LE1/YR: CPT | Mod: HCNC,CPTII,S$GLB, | Performed by: STUDENT IN AN ORGANIZED HEALTH CARE EDUCATION/TRAINING PROGRAM

## 2024-08-01 PROCEDURE — 1125F AMNT PAIN NOTED PAIN PRSNT: CPT | Mod: HCNC,CPTII,S$GLB, | Performed by: STUDENT IN AN ORGANIZED HEALTH CARE EDUCATION/TRAINING PROGRAM

## 2024-08-01 RX ORDER — ATORVASTATIN CALCIUM 40 MG/1
40 TABLET, FILM COATED ORAL NIGHTLY
Qty: 90 TABLET | Refills: 3 | Status: SHIPPED | OUTPATIENT
Start: 2024-08-01

## 2024-08-01 RX ORDER — ARIPIPRAZOLE 5 MG/1
5 TABLET ORAL DAILY
Qty: 30 TABLET | Refills: 0 | Status: SHIPPED | OUTPATIENT
Start: 2024-08-01

## 2024-08-01 RX ORDER — SERTRALINE HYDROCHLORIDE 25 MG/1
25 TABLET, FILM COATED ORAL NIGHTLY
COMMUNITY
End: 2024-08-01 | Stop reason: SDUPTHER

## 2024-08-01 RX ORDER — DOXYCYCLINE 100 MG/1
100 CAPSULE ORAL 2 TIMES DAILY
Qty: 20 CAPSULE | Refills: 0 | Status: SHIPPED | OUTPATIENT
Start: 2024-08-01 | End: 2024-08-12

## 2024-08-01 RX ORDER — SERTRALINE HYDROCHLORIDE 25 MG/1
25 TABLET, FILM COATED ORAL NIGHTLY
Qty: 30 TABLET | Refills: 0 | Status: SHIPPED | OUTPATIENT
Start: 2024-08-01 | End: 2024-08-12

## 2024-08-01 RX ORDER — FLUCONAZOLE 150 MG/1
150 TABLET ORAL DAILY
Qty: 1 TABLET | Refills: 1 | Status: SHIPPED | OUTPATIENT
Start: 2024-08-01

## 2024-08-01 RX ORDER — SERTRALINE HYDROCHLORIDE 100 MG/1
100 TABLET, FILM COATED ORAL NIGHTLY
Qty: 30 TABLET | Refills: 0 | Status: SHIPPED | OUTPATIENT
Start: 2024-08-01 | End: 2025-08-01

## 2024-08-01 NOTE — PATIENT INSTRUCTIONS
Xray today for left sided crackles on the back, and lungs otherwise clear  Begin ten days doxycycline for sinusitis  Continue topical mupirocin and thick application of barrier cream with Zinc (thick white paste like Geovanna's butt paste)  Whenever possible avoid pressure to the buttocks - get a donut pillow like for hemorrhoids on [employer] to alleviate pressure of that area  Continue to keep area clean and dry with gentle soap and warm water, light patting, cool hair dryer taking care not to burn yourself, or letting air dry when possible.  Keep feet elevated.  Do not stick anything in your ears.  Return to clinic in one month or sooner if needed.

## 2024-08-01 NOTE — PROGRESS NOTES
Subjective:       Patient ID: Chloe Dolan is a 68 y.o. female.    Chief Complaint: Rash (Sores on Rt & Lt Buttocks), Cough (Coughing Up - Green Mucus), and Leg Swelling    68 year old female with complex past medical history presents for wound of her buttocks. She is again accompanied by her daughter who contributes to the history.    When checking in the morning her BG has been 252-260. She eats snacks but does not regular meals. In the evening her BG runs 260-300. The lowest was 105 in the hospital a few months ago. She starts to feel shaky if it is normal. She takes Jardiance 10 mg two pills a day. She stopped sulfasalazine because of new constipation where she almost ended up in the ER with it. Dr. Mcnair said she could stop Humira for now. She sees urology in August (she stopped the myrbetriq).    They sent a medication list from the patient's granddaughter who got it from [facility] in [place] when the patient was discharged a few weeks ago:    :  1955    Current Medication List and Notes    1.)  Jardiance:  Takes 2- 10 mg pills a day.  (New prescription was supposed to be increased with refill but was not)  Is getting an appointment with Merle Moreno, Endocrinologist to monitor Type 2 Diabetes.  2.)  Aspirin:  1- 325mg pill once a day.  3.)  Sulfasalazine- 1 pill p/day and 1-pill per night.  (Thinks this might be causing constipation and was only prescribed this to enhance Humera which she is not taking anymore so we have stopped taking this medication.)  4.)  Isosorbide:  1- 30mg pill p/day  5.)  Metoprolol:  1-50mg pill in the morning/ 1- 50mg pill at night  6.)  Zyrtec- 1- pill once daily  7.)  Furosemide:  1-40mg pill once daily.  8.)  Losartin:  1- 50mg pill once daily  9.)  Vitamin D- 1-50mcg pill once daily.  10.)  Folic Acid:  1- 800mcg once daily  11.)  Myrbetriq:  1- 50mg pill once daily.  Does not seem to be working.  Stopped taking.  Seeing a urologist in August.  Will  ask for suggestions.  12.)  Abilify:  1- 5mg tables once daily.  (Needs refill)  13)  Lipitor:  1- pill taken nightly.  (Needs refill)  14.)  Gabapentin:  1-pill am, 1-pill midday and 1-pill at night. 300mg per pill- total 900mg per day.  15.)  Sertraline (Zoloft):  1- 25mg and 1- 100 mg pills per night.  (MG was supposed to be increased.  On waiting list for psychiatry to do medication monitoring and evaluation.  Needs refill.)      Rash  Associated symptoms include coughing.   Cough  Associated symptoms include a rash.       Past Medical History:   Diagnosis Date    Acute bacterial sinusitis 07/31/2024    Diabetes mellitus     Diverticular disease 07/31/2024    Dyspnea on exertion 10/10/2023    Edema 12/19/2023    Heart disease     History of coronary artery bypass graft 07/31/2024    Hyperlipidemia     Hypertension        Past Surgical History:   Procedure Laterality Date    ARTERIAL BYPASS SURGRY      back surgey      CHOLECYSTECTOMY      TOTAL KNEE ARTHROPLASTY      WISDOM TOOTH EXTRACTION         Review of patient's allergies indicates:   Allergen Reactions    Adhesive Itching       Social History     Socioeconomic History    Marital status:    Tobacco Use    Smoking status: Former     Current packs/day: 1.00     Types: Cigarettes   Substance and Sexual Activity    Alcohol use: No    Drug use: No    Sexual activity: Yes     Partners: Male     Comment:      Social Determinants of Health     Financial Resource Strain: Medium Risk (7/15/2024)    Overall Financial Resource Strain (CARDIA)     Difficulty of Paying Living Expenses: Somewhat hard   Food Insecurity: Food Insecurity Present (7/15/2024)    Hunger Vital Sign     Worried About Running Out of Food in the Last Year: Sometimes true     Ran Out of Food in the Last Year: Never true   Transportation Needs: No Transportation Needs (2/27/2024)    Received from [facility]    PRAPARE - Transportation     Lack of Transportation (Medical): No     Lack  of Transportation (Non-Medical): No   Physical Activity: Inactive (7/15/2024)    Exercise Vital Sign     Days of Exercise per Week: 0 days     Minutes of Exercise per Session: 0 min   Stress: Stress Concern Present (7/15/2024)    [place] [employer] - Occupational Stress Questionnaire     Feeling of Stress : Rather much   Housing Stability: Unknown (7/15/2024)    Housing Stability Vital Sign     Unable to Pay for Housing in the Last Year: No       Current Outpatient Medications on File Prior to Visit   Medication Sig Dispense Refill    albuterol (PROVENTIL/VENTOLIN HFA) 90 mcg/actuation inhaler Inhale 2 puffs into the lungs every 4 (four) hours as needed.      aspirin 325 MG tablet Take 1 tablet by mouth once daily.      blood glucose control, low (TRUE METRIX LEVEL 1) Soln use as directed      blood sugar diagnostic Strp 1 strip 2 (two) times a day.      blood-glucose meter Misc Use daily      furosemide (LASIX) 40 MG tablet TAKE 1 TABLET BY MOUTH DAILY AS NEEDED FOR SWELLING      gabapentin (NEURONTIN) 300 MG capsule Take 300 mg by mouth 3 (three) times daily.      ipratropium (ATROVENT) 21 mcg (0.03 %) nasal spray 2 sprays by Each Nostril route 2 (two) times daily.      isosorbide mononitrate (IMDUR) 30 MG 24 hr tablet Take 1 tablet by mouth every morning.      lancets Misc 2 (two) times daily.      loperamide (IMODIUM) 2 mg capsule Take 1 capsule by mouth as needed.      losartan (COZAAR) 50 MG tablet Take 50 mg by mouth once daily.      metoprolol succinate (TOPROL-XL) 50 MG 24 hr tablet 50 mg 2 (two) times daily.  3    mirabegron (MYRBETRIQ) 50 mg Tb24 Take 1 tablet by mouth once daily. (Patient not taking: Reported on 8/1/2024)       No current facility-administered medications on file prior to visit.       Family History   Problem Relation Name Age of Onset    Breast cancer Cousin         Review of Systems   Respiratory:  Positive for cough.    Skin:  Positive for rash.       Objective:  "     /72   Pulse 76   Temp 98.6 °F (37 °C) (Oral)   Ht 5' 5.5" (1.664 m)   Wt 78.3 kg (172 lb 9.9 oz)   SpO2 96%   BMI 28.29 kg/m²   Physical Exam  Constitutional:       General: She is not in acute distress.     Appearance: Normal appearance. She is not ill-appearing, toxic-appearing or diaphoretic.   HENT:      Mouth/Throat:      Mouth: Mucous membranes are moist.   Eyes:      Conjunctiva/sclera: Conjunctivae normal.   Neck:      Vascular: No carotid bruit.   Cardiovascular:      Rate and Rhythm: Normal rate and regular rhythm.   Pulmonary:      Effort: Pulmonary effort is normal.      Breath sounds: Normal breath sounds.   Abdominal:      General: Bowel sounds are normal.      Palpations: Abdomen is soft. There is no mass.      Tenderness: There is no abdominal tenderness.   Lymphadenopathy:      Cervical: No cervical adenopathy.   Skin:     General: Skin is warm and dry.      Findings: Lesion present.      Comments: Sacral wound - picture entered into the chart with Haiku sharri - clean and dry with some white cream present. No foul odor. Wearing adult diaper.   Neurological:      General: No focal deficit present.      Mental Status: She is alert. Mental status is at baseline.   Psychiatric:         Mood and Affect: Mood normal.         Behavior: Behavior normal.         Assessment:       1. Open wound of buttock with complication, unspecified laterality, initial encounter    2. Respiratory crackles, left basilar    3. Acute maxillary sinusitis, recurrence not specified    4. Ear problem, left    5. Productive cough    6. Delusions    7. CAD S/P percutaneous coronary angioplasty    8. Leg swelling    9. Adverse effect of drug, initial encounter    10. Diverticulosis    11. Polyp of ascending colon, unspecified type    12. Polyp of sigmoid colon, unspecified type    13. Internal hemorrhoids        Plan:       Open wound of buttock with complication, unspecified laterality, initial encounter  -     " Ambulatory referral/consult to Wound Clinic; Future; Expected date: 08/08/2024  - Keep clean and pat dry and open to air (consider hair dry on cool setting to avoid burn) and avoid applying pressure as often as possible - consider sitting on a doughnut pillow to offload pressure  - Apply barrier cream with Zinc liberally.  - Continue to apply mupirocin 2-3 times daily.    Respiratory crackles, left basilar  - Xray clear.    Acute maxillary sinusitis, recurrence not specified  -     X-Ray Chest PA And Lateral; Future; Expected date: 08/01/2024  -     doxycycline (MONODOX) 100 MG capsule; Take 1 capsule (100 mg total) by mouth 2 (two) times daily. for 10 days  Dispense: 20 capsule; Refill: 0    Ear problem, left  - Likely related to sinusitis.  - Do not stick anything in your ears.    Productive cough  -     X-Ray Chest PA And Lateral; Future; Expected date: 08/01/2024  -     doxycycline (MONODOX) 100 MG capsule; Take 1 capsule (100 mg total) by mouth 2 (two) times daily. for 10 days  Dispense: 20 capsule; Refill: 0    Delusions  -     ARIPiprazole (ABILIFY) 5 MG Tab; Take 1 tablet (5 mg total) by mouth once daily.  Dispense: 30 tablet; Refill: 0  -     sertraline (ZOLOFT) 25 MG tablet; Take 1 tablet (25 mg total) by mouth nightly.  Dispense: 30 tablet; Refill: 0  -     sertraline (ZOLOFT) 100 MG tablet; Take 1 tablet (100 mg total) by mouth every evening.  Dispense: 30 tablet; Refill: 0    CAD S/P percutaneous coronary angioplasty  -     atorvastatin (LIPITOR) 40 MG tablet; Take 1 tablet (40 mg total) by mouth every evening.  Dispense: 90 tablet; Refill: 3    Leg swelling  - Mild. Keep legs elevated when possible.    Adverse effect of drug, initial encounter  -     fluconazole (DIFLUCAN) 150 MG Tab; Take one tablet by mouth one time at end of doxycycline for anticipated medication adverse effect of vaginal candidiasis. If no improvement may repeat in three days.  Dispense: 1 tablet; Refill: 1  - Anticipate adverse  effect of vaginal candidiasis with antibiotic for sinusitis.    Diverticulosis  -     Case Request Endoscopy: COLONOSCOPY    Polyp of ascending colon, unspecified type  -     Case Request Endoscopy: COLONOSCOPY    Polyp of sigmoid colon, unspecified type  -     Case Request Endoscopy: COLONOSCOPY    Internal hemorrhoids  - Colonoscopy ordered.      Return to clinic in one month or sooner if needed.

## 2024-08-02 ENCOUNTER — TELEPHONE (OUTPATIENT)
Dept: FAMILY MEDICINE | Facility: CLINIC | Age: 69
End: 2024-08-02
Payer: MEDICARE

## 2024-08-02 NOTE — TELEPHONE ENCOUNTER
----- Message from Floresita Dominguez DO sent at 7/29/2024  9:55 PM CDT -----  Regarding: Please call for appointment.  Needs most recent discharge summary records request and current medication bottles, inhalers, injections - all of it to come to her appointment. Thank you.

## 2024-08-07 ENCOUNTER — PATIENT OUTREACH (OUTPATIENT)
Dept: ADMINISTRATIVE | Facility: HOSPITAL | Age: 69
End: 2024-08-07
Payer: MEDICARE

## 2024-08-11 ENCOUNTER — PATIENT MESSAGE (OUTPATIENT)
Dept: FAMILY MEDICINE | Facility: CLINIC | Age: 69
End: 2024-08-11
Payer: MEDICARE

## 2024-08-12 DIAGNOSIS — F33.3 SEVERE EPISODE OF RECURRENT MAJOR DEPRESSIVE DISORDER, WITH PSYCHOTIC FEATURES: ICD-10-CM

## 2024-08-12 DIAGNOSIS — F22 DELUSIONS: Primary | ICD-10-CM

## 2024-08-12 DIAGNOSIS — F41.9 ANXIETY: ICD-10-CM

## 2024-08-12 RX ORDER — SERTRALINE HYDROCHLORIDE 50 MG/1
50 TABLET, FILM COATED ORAL DAILY
Qty: 30 TABLET | Refills: 11 | Status: SHIPPED | OUTPATIENT
Start: 2024-08-12 | End: 2025-08-12

## 2024-08-13 ENCOUNTER — TELEPHONE (OUTPATIENT)
Dept: FAMILY MEDICINE | Facility: CLINIC | Age: 69
End: 2024-08-13
Payer: MEDICARE

## 2024-08-14 DIAGNOSIS — Z78.0 MENOPAUSE: ICD-10-CM

## 2024-08-18 ENCOUNTER — PATIENT MESSAGE (OUTPATIENT)
Dept: FAMILY MEDICINE | Facility: CLINIC | Age: 69
End: 2024-08-18
Payer: MEDICARE

## 2024-08-19 ENCOUNTER — TELEPHONE (OUTPATIENT)
Dept: NEUROLOGY | Facility: CLINIC | Age: 69
End: 2024-08-19

## 2024-08-19 ENCOUNTER — TELEPHONE (OUTPATIENT)
Dept: FAMILY MEDICINE | Facility: CLINIC | Age: 69
End: 2024-08-19
Payer: MEDICARE

## 2024-08-19 ENCOUNTER — TELEPHONE (OUTPATIENT)
Dept: RHEUMATOLOGY | Facility: CLINIC | Age: 69
End: 2024-08-19
Payer: MEDICARE

## 2024-08-19 RX ORDER — GABAPENTIN 300 MG/1
CAPSULE ORAL
Qty: 540 CAPSULE | Refills: 1 | Status: SHIPPED | OUTPATIENT
Start: 2024-08-19

## 2024-08-19 NOTE — TELEPHONE ENCOUNTER
Gabapentin needs refill for neuropathy in her feet - used to be prescribed by Dr. Smith and now she is out. She is scheduled for a bone density test. She is better and wound is healing from her back side. There is recent short term memory loss (eg. Asking where boyfriend went an hour after she asked him before he left). She is not taking anything for her arthritis and they are waiting to hear back from rheumatology after putting a call in. Scheduled on 9/3/24 with me. I will be out for a week and if she needs ask for either Dr. Osorio or Makenna Hernandez, FRANCISCO. Grateful for the call. No further questions.

## 2024-08-19 NOTE — TELEPHONE ENCOUNTER
----- Message from Nicole Huggnis MA sent at 8/19/2024  4:37 PM CDT -----  Contact: pt daughter emmanuel    ----- Message -----  From: Alayna Wilson  Sent: 8/19/2024   4:34 PM CDT  To: [facility] Staff    Type:  Sooner Appointment Request    Caller is requesting a sooner appointment.  Caller declined first available appointment listed below.  Caller will not accept being placed on the waitlist and is requesting a message be sent to doctor.    Name of Caller:  pt daughter emmanuel  When is the first available appointment?  N/a  Symptoms:  psoric arthritis pt was seeing dr sheeba murillo at [facility] [place]  Would the patient rather a call back or a response via [facility]? call  Best Call Back Number:  [phone]  Additional Information:  please call

## 2024-08-20 ENCOUNTER — PATIENT MESSAGE (OUTPATIENT)
Dept: ADMINISTRATIVE | Facility: HOSPITAL | Age: 69
End: 2024-08-20
Payer: MEDICARE

## 2024-08-26 ENCOUNTER — OFFICE VISIT (OUTPATIENT)
Dept: UROLOGY | Facility: CLINIC | Age: 69
End: 2024-08-26
Payer: MEDICARE

## 2024-08-26 VITALS — WEIGHT: 169.75 LBS | HEIGHT: 66 IN | BODY MASS INDEX: 27.28 KG/M2

## 2024-08-26 DIAGNOSIS — R32 URINARY INCONTINENCE, UNSPECIFIED TYPE: ICD-10-CM

## 2024-08-26 PROBLEM — I50.30 (HFPEF) HEART FAILURE WITH PRESERVED EJECTION FRACTION: Status: ACTIVE | Noted: 2024-07-22

## 2024-08-26 LAB — POC RESIDUAL URINE VOLUME: 1 ML (ref 0–100)

## 2024-08-26 PROCEDURE — 1126F AMNT PAIN NOTED NONE PRSNT: CPT | Mod: CPTII,S$GLB,, | Performed by: UROLOGY

## 2024-08-26 PROCEDURE — 3288F FALL RISK ASSESSMENT DOCD: CPT | Mod: CPTII,S$GLB,, | Performed by: UROLOGY

## 2024-08-26 PROCEDURE — 1159F MED LIST DOCD IN RCRD: CPT | Mod: CPTII,S$GLB,, | Performed by: UROLOGY

## 2024-08-26 PROCEDURE — 99204 OFFICE O/P NEW MOD 45 MIN: CPT | Mod: S$GLB,,, | Performed by: UROLOGY

## 2024-08-26 PROCEDURE — 1101F PT FALLS ASSESS-DOCD LE1/YR: CPT | Mod: CPTII,S$GLB,, | Performed by: UROLOGY

## 2024-08-26 PROCEDURE — 51798 US URINE CAPACITY MEASURE: CPT | Mod: S$GLB,,, | Performed by: UROLOGY

## 2024-08-26 PROCEDURE — 4010F ACE/ARB THERAPY RXD/TAKEN: CPT | Mod: CPTII,S$GLB,, | Performed by: UROLOGY

## 2024-08-26 PROCEDURE — 99999 PR PBB SHADOW E&M-EST. PATIENT-LVL III: CPT | Mod: PBBFAC,,, | Performed by: UROLOGY

## 2024-08-26 PROCEDURE — 3008F BODY MASS INDEX DOCD: CPT | Mod: CPTII,S$GLB,, | Performed by: UROLOGY

## 2024-08-26 RX ORDER — TROSPIUM CHLORIDE 20 MG/1
20 TABLET, FILM COATED ORAL 2 TIMES DAILY
Qty: 60 TABLET | Refills: 11 | Status: SHIPPED | OUTPATIENT
Start: 2024-08-26 | End: 2025-08-26

## 2024-08-26 NOTE — PROGRESS NOTES
"[facility]      New Overactive Bladder (OAB) Note    8/26/2024    Referred by:  Floresita Dominguez DO    History of Present Illness    Ms. Dolan presents today for urinary incontinence.    She reports urinary incontinence present for several years, worsening over time. Leakage is triggered by standing up from sitting, bending over, and even while sitting. Frequency is 3-5 times during the day, possibly more, and 1-2 times at night. She manages symptoms using 6 depends per day. She previously tried Myrbetriq 50 mg for about 6-8 weeks without noticeable improvement. She reports possible urinary tract infection symptoms, including a slight burning sensation, but notes that this discomfort is not experienced during urination. She denies difficulty urinating, presence of a vaginal bulge, or visible blood in her urine.    She has a history of degenerative disk disease with neuropathy, ischemic heart disease, type 2 diabetes, and leg swelling. She underwent a recent coronary artery bypass graft in July 2024 and has had three lower back surgeries, with the last one in 2022. She also had a previous coronary bypass approximately 7-10 years ago.    She reports difficulty lying flat since her recent surgery and currently sleeps in a chair due to discomfort. She experiences pain when moving too quickly or bending in certain ways, which may be related to her recent surgical procedures. She is scheduled to see a cardiologist next week for follow-up.    She takes Lasix 40 mg in the morning for leg swelling, and also uses Imodium and Neurontin. She recently took AZO, specifically the night before the visit.    She experienced constipation last month, which she attributes to medications she was taking at that time. She denies current problems with constipation.    She reports progressively worsening short-term memory issues over several years, which she describes as "driving her crazy." She is concerned about " the potential impact of medications on her memory.        A review of 10+ systems was conducted with pertinent positive and negative findings documented in HPI with all other systems reviewed and negative.    Past medical, family, surgical and social history reviewed as documented in chart with pertinent positive medical, family, surgical and social history detailed in HPI.    Previous OAB therapies:  Behavioral Therapies  : (fluid/dietary modification) -  provided some but insufficient benefit  Medications  mirabegron (Myrbetriq) 50 mg 2 months (provided no benefit)  Gemtesa 75 mg 3 months (provided no benefit)  Other Therapies  No Other Therapies      Exam Findings:    Physical Exam    General: No acute distress. Nontoxic appearing.  HENT: Normocephalic. Atraumatic.  Respiratory: Normal respiratory effort. No conversational dyspnea. No audible wheezing.  Abdomen: No obvious distension.  Skin: No visible abnormalities.  Extremities: No edema upper extremities. No edema lower extremities.  Neurological: Alert and oriented x3. Normal speech.  Psychiatric: Normal mood. Normal affect. No evidence of SI.          Assessment/Plan:    Assessment & Plan    - Patient presents with long-standing urinary incontinence, worsening over time  - Previous trial of Myrbetriq 50mg for 6-8 weeks without significant improvement  - Considering alternative overactive bladder medication options, prioritizing those with minimal impact on cognitive function due to patient's reported short-term memory issues  - If medication management proves ineffective, may consider more invasive options such as sacral neuromodulation or Botox injections  OVERACTIVE BLADDER:  - Discussed sacral neuromodulation as a potential future treatment option for refractory overactive bladder symptoms.  - Explained Botox as another surgical alternative if medications fail to provide adequate symptom relief.  - Discontinued Myrbetriq 50mg.  - Started trospium 20mg  twice daily for overactive bladder symptoms.  - Follow up in 4-6 weeks to assess response to trospium.  - If no improvement with trospium, will discuss alternative treatment options including sacral neuromodulation and Botox.  FLUID MANAGEMENT:  - Continued Lasix 40mg daily in the morning for fluid management.

## 2024-08-28 ENCOUNTER — PATIENT MESSAGE (OUTPATIENT)
Dept: FAMILY MEDICINE | Facility: CLINIC | Age: 69
End: 2024-08-28
Payer: MEDICARE

## 2024-08-28 ENCOUNTER — TELEPHONE (OUTPATIENT)
Dept: CARDIOLOGY | Facility: CLINIC | Age: 69
End: 2024-08-28
Payer: MEDICARE

## 2024-08-29 ENCOUNTER — PATIENT MESSAGE (OUTPATIENT)
Dept: FAMILY MEDICINE | Facility: CLINIC | Age: 69
End: 2024-08-29
Payer: MEDICARE

## 2024-08-29 DIAGNOSIS — E11.40 TYPE 2 DIABETES MELLITUS WITH DIABETIC NEUROPATHY, WITHOUT LONG-TERM CURRENT USE OF INSULIN: Primary | ICD-10-CM

## 2024-08-29 RX ORDER — METFORMIN HYDROCHLORIDE 500 MG/1
TABLET ORAL
Qty: 180 TABLET | Refills: 3 | Status: SHIPPED | OUTPATIENT
Start: 2024-08-29

## 2024-08-30 ENCOUNTER — TELEPHONE (OUTPATIENT)
Dept: GASTROENTEROLOGY | Facility: CLINIC | Age: 69
End: 2024-08-30
Payer: MEDICARE

## 2024-08-30 NOTE — TELEPHONE ENCOUNTER
Spoke to tp in regards to scheduling cscope. Pt states she needs to talk with her daughter since she is her transportation. Pt states she will call back this afternoon to schedule. Call back # provided to pt

## 2024-09-02 DIAGNOSIS — F22 DELUSIONS: ICD-10-CM

## 2024-09-02 RX ORDER — SERTRALINE HYDROCHLORIDE 100 MG/1
100 TABLET, FILM COATED ORAL NIGHTLY
Qty: 30 TABLET | Refills: 0 | Status: CANCELLED | OUTPATIENT
Start: 2024-09-02 | End: 2025-09-02

## 2024-09-02 RX ORDER — ARIPIPRAZOLE 5 MG/1
5 TABLET ORAL DAILY
Qty: 30 TABLET | Refills: 0 | Status: CANCELLED | OUTPATIENT
Start: 2024-09-02

## 2024-09-03 ENCOUNTER — OFFICE VISIT (OUTPATIENT)
Dept: FAMILY MEDICINE | Facility: CLINIC | Age: 69
End: 2024-09-03
Payer: MEDICARE

## 2024-09-03 VITALS
OXYGEN SATURATION: 95 % | BODY MASS INDEX: 27.53 KG/M2 | SYSTOLIC BLOOD PRESSURE: 108 MMHG | HEIGHT: 66 IN | DIASTOLIC BLOOD PRESSURE: 62 MMHG | WEIGHT: 171.31 LBS | HEART RATE: 81 BPM

## 2024-09-03 DIAGNOSIS — I50.32 CHRONIC HEART FAILURE WITH PRESERVED EJECTION FRACTION: ICD-10-CM

## 2024-09-03 DIAGNOSIS — F32.89 OTHER DEPRESSION: ICD-10-CM

## 2024-09-03 DIAGNOSIS — D69.6 THROMBOCYTOPENIA, UNSPECIFIED: ICD-10-CM

## 2024-09-03 DIAGNOSIS — R90.89 ABNORMAL CT OF BRAIN: ICD-10-CM

## 2024-09-03 DIAGNOSIS — I10 PRIMARY HYPERTENSION: ICD-10-CM

## 2024-09-03 DIAGNOSIS — F22 DELUSIONS: ICD-10-CM

## 2024-09-03 DIAGNOSIS — R91.8 MULTIPLE PULMONARY NODULES: ICD-10-CM

## 2024-09-03 DIAGNOSIS — R59.9 LYMPH NODE ENLARGEMENT: ICD-10-CM

## 2024-09-03 DIAGNOSIS — R41.3 MEMORY CHANGE: ICD-10-CM

## 2024-09-03 DIAGNOSIS — N39.498 OTHER URINARY INCONTINENCE: ICD-10-CM

## 2024-09-03 DIAGNOSIS — S12.100A CLOSED ODONTOID FRACTURE, INITIAL ENCOUNTER: ICD-10-CM

## 2024-09-03 DIAGNOSIS — R60.9 3+ PITTING EDEMA: Primary | ICD-10-CM

## 2024-09-03 DIAGNOSIS — L40.50 PSORIATIC ARTHRITIS: ICD-10-CM

## 2024-09-03 DIAGNOSIS — E11.65 UNCONTROLLED TYPE 2 DIABETES MELLITUS WITH HYPERGLYCEMIA: ICD-10-CM

## 2024-09-03 PROCEDURE — 3288F FALL RISK ASSESSMENT DOCD: CPT | Mod: CPTII,S$GLB,, | Performed by: STUDENT IN AN ORGANIZED HEALTH CARE EDUCATION/TRAINING PROGRAM

## 2024-09-03 PROCEDURE — 99999 PR PBB SHADOW E&M-EST. PATIENT-LVL V: CPT | Mod: PBBFAC,,, | Performed by: STUDENT IN AN ORGANIZED HEALTH CARE EDUCATION/TRAINING PROGRAM

## 2024-09-03 PROCEDURE — 3078F DIAST BP <80 MM HG: CPT | Mod: CPTII,S$GLB,, | Performed by: STUDENT IN AN ORGANIZED HEALTH CARE EDUCATION/TRAINING PROGRAM

## 2024-09-03 PROCEDURE — 4010F ACE/ARB THERAPY RXD/TAKEN: CPT | Mod: CPTII,S$GLB,, | Performed by: STUDENT IN AN ORGANIZED HEALTH CARE EDUCATION/TRAINING PROGRAM

## 2024-09-03 PROCEDURE — 99214 OFFICE O/P EST MOD 30 MIN: CPT | Mod: S$GLB,,, | Performed by: STUDENT IN AN ORGANIZED HEALTH CARE EDUCATION/TRAINING PROGRAM

## 2024-09-03 PROCEDURE — 1125F AMNT PAIN NOTED PAIN PRSNT: CPT | Mod: CPTII,S$GLB,, | Performed by: STUDENT IN AN ORGANIZED HEALTH CARE EDUCATION/TRAINING PROGRAM

## 2024-09-03 PROCEDURE — 3074F SYST BP LT 130 MM HG: CPT | Mod: CPTII,S$GLB,, | Performed by: STUDENT IN AN ORGANIZED HEALTH CARE EDUCATION/TRAINING PROGRAM

## 2024-09-03 PROCEDURE — 1159F MED LIST DOCD IN RCRD: CPT | Mod: CPTII,S$GLB,, | Performed by: STUDENT IN AN ORGANIZED HEALTH CARE EDUCATION/TRAINING PROGRAM

## 2024-09-03 PROCEDURE — 1100F PTFALLS ASSESS-DOCD GE2>/YR: CPT | Mod: CPTII,S$GLB,, | Performed by: STUDENT IN AN ORGANIZED HEALTH CARE EDUCATION/TRAINING PROGRAM

## 2024-09-03 PROCEDURE — 3008F BODY MASS INDEX DOCD: CPT | Mod: CPTII,S$GLB,, | Performed by: STUDENT IN AN ORGANIZED HEALTH CARE EDUCATION/TRAINING PROGRAM

## 2024-09-03 RX ORDER — CHLORTHALIDONE 25 MG/1
12.5 TABLET ORAL DAILY
Qty: 15 TABLET | Refills: 11 | Status: SHIPPED | OUTPATIENT
Start: 2024-09-03 | End: 2025-09-03

## 2024-09-03 RX ORDER — SERTRALINE HYDROCHLORIDE 100 MG/1
100 TABLET, FILM COATED ORAL NIGHTLY
Qty: 30 TABLET | Refills: 0 | Status: SHIPPED | OUTPATIENT
Start: 2024-09-03 | End: 2025-09-03

## 2024-09-03 RX ORDER — ARIPIPRAZOLE 5 MG/1
5 TABLET ORAL DAILY
Qty: 30 TABLET | Refills: 0 | Status: SHIPPED | OUTPATIENT
Start: 2024-09-03

## 2024-09-03 NOTE — PROGRESS NOTES
Subjective:       Patient ID: Chloe Dolan is a 68 y.o. female.    Chief Complaint: Follow-up (ER follow up)    68 year old female presents to ER follow up accompanied by her daughter who contributes to the history. She had to rescheduled her DEXA scan (used to be on Fosamax). She was seated in her chair and leaned over to  her phone off the floor and tipped over and hit her face and left side. She has a goose egg, bruising, and broken ribs but no fractures of the face. Her EKG was abnormal with LVH though when compared with previous in June was similar. Imaging of the head with CT showed involution and microvascular changes of the white matter along with intracranial atherosclerosis. Imaging with CT of the chest revealed precarinal lymph node of indeterminate significance. Blood work was notable for hyperglycemia in the 400s. Her daughter reached out to me and we have restarted her metformin now at 1000 mg by mouth twice daily. She is on a wait list to see endocrinology. She is working on eating better and cutting back on sugar. She has seen wound care and her bottom is doing better though not resolved. There is new swelling in bilateral lower extremities. It is mildly uncomfortable. She has been taking her lasix 40 mg twice daily which usually helps, but it has not. Her daughter set her up to follow back up with cardiology upcoming on 9/23 with Dr. Chen. She is on the waiting list to see psychiatry. Since last visit we have increased her sertraline which is at 150 mg daily. She continues abilify 5 mg daily. Mood is somewhat improved from previous though she still feels like she needs something and has crying spells. She has seen urology and is going to try [facility] prescribed by them for bladder control and if this is unhelpful may need to pursue surgery. At last visit she was prescribed an antibiotic, doxycycline, for upper respiratory infection and while she still coughs related to COPD that is  improved and she has albuterol as needed. The plan for this visit was to do a memory test but we are unable to complete this today - the patient admits that she knows she probably does have some trouble remembering things but is not that worried about it right now.    Physical exam today is notable for bilateral equal 3+ pitting edema extending superiorly to the knees. Skin is intact. She is mildly tender to moderate palpation. Toes are somewhat pinkish which is blanchable. Overall breath sounds are clear though again I hear focal, left sided basilar crackles as heard previously with infection last visit.    Plan:  - For diuresis, begin chlorthalidone 25 mg Take one half tablet by mouth daily and return in 1-2 weeks for recheck of edema and lab work to check electrolytes.  - Continue to see cardiology - patient is likely due for echo which she usually gets every six months.  - Refer to pulmonology, family friend Dr. Helio Chandra, for precarinal lymph node of indeterminate significance.  - Continue with wound care. Consider increasing protein (eg. Glucerna, Boost) and supplementing with multivitamin or 1000 mg of Vitamin C daily while healing.  - Complete memory test on follow up with [facility].  - Continue sertraline 150 mg and abilify 5 mg daily - Consider PHQ-9 and ANTONIETA-7 on follow up.  - Continue metformin 1000 mg PO BID and dietary changes. Continue Jardiance. Will consider adding GLP-1 next in patient with no personal or family history of thyroid cancer or pancreatitis.  - Anticipate DEXA, consider resuming Fosamax.    Follow-up        Past Medical History:   Diagnosis Date    Acute bacterial sinusitis 07/31/2024    Diabetes mellitus     Diverticular disease 07/31/2024    Dyspnea on exertion 10/10/2023    Edema 12/19/2023    Heart disease     History of coronary artery bypass graft 07/31/2024    Hyperlipidemia     Hypertension     Neuropathy        Past Surgical History:   Procedure Laterality Date    ARTERIAL BYPASS  SURGRY      back surgey      CHOLECYSTECTOMY      TOTAL KNEE ARTHROPLASTY      WISDOM TOOTH EXTRACTION         Review of patient's allergies indicates:   Allergen Reactions    Adhesive Itching       Social History     Socioeconomic History    Marital status:    Tobacco Use    Smoking status: Former     Current packs/day: 1.00     Types: Cigarettes   Substance and Sexual Activity    Alcohol use: No    Drug use: No    Sexual activity: Yes     Partners: Male     Comment:      Social Determinants of Health     Financial Resource Strain: Medium Risk (7/15/2024)    Overall Financial Resource Strain (CARDIA)     Difficulty of Paying Living Expenses: Somewhat hard   Food Insecurity: Food Insecurity Present (7/15/2024)    Hunger Vital Sign     Worried About Running Out of Food in the Last Year: Sometimes true     Ran Out of Food in the Last Year: Never true   Transportation Needs: No Transportation Needs (2/27/2024)    [facility]     Lack of Transportation (Medical): No     Lack of Transportation (Non-Medical): No   Physical Activity: Inactive (7/15/2024)    Exercise Vital Sign     Days of Exercise per Week: 0 days     Minutes of Exercise per Session: 0 min   Stress: Stress Concern Present (7/15/2024)    [employer] - Occupational Stress Questionnaire     Feeling of Stress : Rather much   Housing Stability: Unknown (7/15/2024)    Housing Stability Vital Sign     Unable to Pay for Housing in the Last Year: No       Current Outpatient Medications on File Prior to Visit   Medication Sig Dispense Refill    albuterol (PROVENTIL/VENTOLIN HFA) 90 mcg/actuation inhaler Inhale 2 puffs into the lungs every 4 (four) hours as needed.      aspirin 325 MG tablet Take 1 tablet by mouth once daily.      atorvastatin (LIPITOR) 40 MG tablet Take 1 tablet (40 mg total) by mouth every evening. 90 tablet 3    blood glucose control, low (TRUE METRIX LEVEL 1)  Soln use as directed      blood sugar diagnostic Strp 1 strip 2 (two) times a day.      blood-glucose meter Misc Use daily      fluconazole (DIFLUCAN) 150 MG Tab Take one tablet by mouth one time at end of doxycycline for anticipated medication adverse effect of vaginal candidiasis. If no improvement may repeat in three days. 1 tablet 1    furosemide (LASIX) 40 MG tablet TAKE 1 TABLET BY MOUTH DAILY AS NEEDED FOR SWELLING      gabapentin (NEURONTIN) 300 MG capsule Take one capsule by mouth in the morning, one capsule by mouth midday, and two capsules in the evening. 540 capsule 1    HYDROcodone-acetaminophen (NORCO) 5-325 mg per tablet Take 1 tablet by mouth every 6 (six) hours as needed for Pain. 15 tablet 0    ipratropium (ATROVENT) 21 mcg (0.03 %) nasal spray 2 sprays by Each Nostril route 2 (two) times daily.      isosorbide mononitrate (IMDUR) 30 MG 24 hr tablet Take 1 tablet by mouth every morning.      JARDIANCE 25 mg tablet Take 25 mg by mouth once daily.      lancets Misc 2 (two) times daily.      loperamide (IMODIUM) 2 mg capsule Take 1 capsule by mouth as needed.      losartan (COZAAR) 50 MG tablet Take 50 mg by mouth once daily.      metFORMIN (GLUCOPHAGE) 500 MG tablet Take one tab by mouth at bedtime for one week. Week 2 take one tab by mouth twice daily. Week 3 take one tab by mouth every morning and two tabs by mouth at bedtime. Week 4 take two tabs by mouth 2 times daily. If diarrhea develops, go back down to last tolerated dose. 180 tablet 3    metoprolol succinate (TOPROL-XL) 50 MG 24 hr tablet 50 mg 2 (two) times daily.  3    sertraline (ZOLOFT) 50 MG tablet Take 1 tablet (50 mg total) by mouth once daily. 30 tablet 11    trospium (SANCTURA) 20 mg Tab tablet Take 1 tablet (20 mg total) by mouth 2 (two) times daily. 60 tablet 11    mirabegron (MYRBETRIQ) 50 mg Tb24 Take 1 tablet by mouth once daily. (Patient not taking: Reported on 8/1/2024)       No current facility-administered medications on  "file prior to visit.       Family History   Problem Relation Name Age of Onset    Stroke Mother      Heart attack Mother      Cancer Father      Diabetes Maternal Grandmother      Heart disease Maternal Grandmother      Breast cancer Cousin         Review of Systems    Objective:      /62   Pulse 81   Ht 5' 5.5" (1.664 m)   Wt 77.7 kg (171 lb 4.8 oz)   SpO2 95%   BMI 28.07 kg/m²   Physical Exam  Constitutional:       General: She is not in acute distress.  HENT:      Mouth/Throat:      Mouth: Mucous membranes are moist.   Eyes:      Conjunctiva/sclera: Conjunctivae normal.   Cardiovascular:      Rate and Rhythm: Normal rate and regular rhythm.      Heart sounds: Normal heart sounds.   Pulmonary:      Effort: Pulmonary effort is normal.      Comments: Bilateral good air movement but lower left focal crackles similar to previous  Abdominal:      General: Bowel sounds are normal.   Musculoskeletal:      Right lower leg: Edema present.      Left lower leg: Edema present.      Comments: Facial bruising and edema at the left brow; Bilateral 3+ pitting edema to the knees; mildly tender; toes with some blanchable hyperemia   Skin:     General: Skin is warm and dry.   Neurological:      General: No focal deficit present.      Mental Status: She is alert. Mental status is at baseline.   Psychiatric:         Mood and Affect: Mood normal.         Behavior: Behavior normal.         Assessment:       1. 3+ pitting edema    2. Primary hypertension    3. Chronic heart failure with preserved ejection fraction    4. Lymph node enlargement    5. Closed odontoid fracture, initial encounter    6. Abnormal CT of brain    7. Other urinary incontinence    8. Multiple pulmonary nodules    9. Other depression    10. Uncontrolled type 2 diabetes mellitus with hyperglycemia    11. Psoriatic arthritis    12. Memory change    13. Thrombocytopenia, unspecified        Plan:       3+ pitting edema  -     chlorthalidone (HYGROTEN) 25 MG " Tab; Take one-half tablet (12.5 mg total) by mouth once daily.  Dispense: 15 tablet; Refill: 11  - Continue lasix 40 mg po BID. Add chlorthalidone and return in 1-2 weeks for recheck. No shortness of breath. Only focal crackles as heard on previous visit with respiratory infection. No effusion on CT chest in ER 8/27/24. Continue to see Dr. Chen with cardiology on the 23rd.    Primary hypertension  - Controlled today but due to lower extremity edema add thiazide to loop diuretic.    Chronic heart failure with preserved ejection fraction  - See pitting edema above.    Lymph node enlargement  -     Ambulatory referral/consult to Pulmonology; Future; Expected date: 09/10/2024  - Refer to pulmonology Dr. Helio Chandra, family friend. Precarinal node identified on CT and multiple pulmonary nodules.    Multiple pulmonary nodules  - Refer to pulmonology.    Closed odontoid fracture, initial encounter  - Chronic in nature on CT cervical spine and fits wit history of psoriatic and record of rheumatoid arthritis.    Abnormal CT of brain  - Chronic involutional and microvascular ischemic changes along with intracranial atherosclerosis.    Other urinary incontinence  - Patient is about to try new prescription from urology, [facility].    Other depression  - Consider PHQ-9 and ANTONIETA-7 on follow up in 1-2 weeks. Still with some crying spells on sertraline 150 mg and abilify 5 mg.    Uncontrolled type 2 diabetes mellitus with hyperglycemia  - Consider adding GLP-1 to current Metformin 1000 mg BID and Jardiance (though only just restarted metformin within the last week and blood sugar down from typical 3-400 to 160-180. Continue with dietary changes.    Psoriatic arthritis  - Awaiting rheumatology appointment. Used to be on Fosamax. Rescheduling DEXA.    Memory change  - On follow up complete mini mental status exam.    Thrombocytopenia  - Not discussed, today. History of and present at 125,000 on labs in ER. Recheck on follow up.       Return to clinic in 1-2 weeks for recheck of edema and electrolytes, memory test, and mood.

## 2024-09-03 NOTE — PATIENT INSTRUCTIONS
Multivitamin, and vitamin C 500 mg BID for wound healing  1.3 precarinal lymph node of indeterminate significance  Leg swelling - add chlorthalidone 12.5 mg once every morning  Return for labs and recheck in 1-2 weeks  Will need echocardiogram when you see cardiology, Dr. Chen on the 23rd  Continue metformin 1000 mg twice daily    Team: Makenna Hernandez NP; Dr. Osorio

## 2024-09-04 ENCOUNTER — PATIENT MESSAGE (OUTPATIENT)
Dept: PSYCHIATRY | Facility: CLINIC | Age: 69
End: 2024-09-04
Payer: MEDICARE

## 2024-09-04 PROBLEM — R41.3 MEMORY CHANGE: Status: ACTIVE | Noted: 2024-09-04

## 2024-09-04 PROBLEM — R59.9 LYMPH NODE ENLARGEMENT: Status: ACTIVE | Noted: 2024-09-04

## 2024-09-04 PROBLEM — S12.110A DENS FRACTURE: Status: ACTIVE | Noted: 2024-09-04

## 2024-09-04 PROBLEM — R90.89 ABNORMAL CT OF BRAIN: Status: ACTIVE | Noted: 2024-09-04

## 2024-09-04 PROBLEM — R60.9 3+ PITTING EDEMA: Status: ACTIVE | Noted: 2023-12-19

## 2024-09-04 PROBLEM — M79.89 LEG SWELLING: Status: RESOLVED | Noted: 2024-08-01 | Resolved: 2024-09-04

## 2024-09-04 PROBLEM — D69.6 THROMBOCYTOPENIA, UNSPECIFIED: Status: ACTIVE | Noted: 2024-09-04

## 2024-09-05 ENCOUNTER — PATIENT MESSAGE (OUTPATIENT)
Dept: FAMILY MEDICINE | Facility: CLINIC | Age: 69
End: 2024-09-05
Payer: MEDICARE

## 2024-09-06 ENCOUNTER — TELEPHONE (OUTPATIENT)
Dept: FAMILY MEDICINE | Facility: CLINIC | Age: 69
End: 2024-09-06
Payer: MEDICARE

## 2024-09-06 ENCOUNTER — PATIENT MESSAGE (OUTPATIENT)
Dept: FAMILY MEDICINE | Facility: CLINIC | Age: 69
End: 2024-09-06
Payer: MEDICARE

## 2024-09-06 NOTE — TELEPHONE ENCOUNTER
Left voicemail for call back and reached out through portal to check in about leg swelling with no improvement after starting HCTZ.

## 2024-09-10 ENCOUNTER — OFFICE VISIT (OUTPATIENT)
Dept: PULMONOLOGY | Facility: CLINIC | Age: 69
End: 2024-09-10
Payer: MEDICARE

## 2024-09-10 VITALS
SYSTOLIC BLOOD PRESSURE: 108 MMHG | HEART RATE: 83 BPM | WEIGHT: 164 LBS | HEIGHT: 66 IN | BODY MASS INDEX: 26.36 KG/M2 | DIASTOLIC BLOOD PRESSURE: 68 MMHG | OXYGEN SATURATION: 97 %

## 2024-09-10 DIAGNOSIS — J44.9 CHRONIC OBSTRUCTIVE PULMONARY DISEASE, UNSPECIFIED COPD TYPE: ICD-10-CM

## 2024-09-10 DIAGNOSIS — R05.3 CHRONIC COUGH: Primary | ICD-10-CM

## 2024-09-10 DIAGNOSIS — R59.9 LYMPH NODE ENLARGEMENT: ICD-10-CM

## 2024-09-10 DIAGNOSIS — R29.6 FREQUENT FALLS: ICD-10-CM

## 2024-09-10 PROCEDURE — 3078F DIAST BP <80 MM HG: CPT | Mod: CPTII,S$GLB,, | Performed by: INTERNAL MEDICINE

## 2024-09-10 PROCEDURE — 1100F PTFALLS ASSESS-DOCD GE2>/YR: CPT | Mod: CPTII,S$GLB,, | Performed by: INTERNAL MEDICINE

## 2024-09-10 PROCEDURE — 3074F SYST BP LT 130 MM HG: CPT | Mod: CPTII,S$GLB,, | Performed by: INTERNAL MEDICINE

## 2024-09-10 PROCEDURE — 4010F ACE/ARB THERAPY RXD/TAKEN: CPT | Mod: CPTII,S$GLB,, | Performed by: INTERNAL MEDICINE

## 2024-09-10 PROCEDURE — 99203 OFFICE O/P NEW LOW 30 MIN: CPT | Mod: S$GLB,,, | Performed by: INTERNAL MEDICINE

## 2024-09-10 PROCEDURE — 3008F BODY MASS INDEX DOCD: CPT | Mod: CPTII,S$GLB,, | Performed by: INTERNAL MEDICINE

## 2024-09-10 PROCEDURE — 99999 PR PBB SHADOW E&M-EST. PATIENT-LVL III: CPT | Mod: PBBFAC,,, | Performed by: INTERNAL MEDICINE

## 2024-09-10 PROCEDURE — 3288F FALL RISK ASSESSMENT DOCD: CPT | Mod: CPTII,S$GLB,, | Performed by: INTERNAL MEDICINE

## 2024-09-10 RX ORDER — FLUTICASONE FUROATE, UMECLIDINIUM BROMIDE AND VILANTEROL TRIFENATATE 200; 62.5; 25 UG/1; UG/1; UG/1
1 POWDER RESPIRATORY (INHALATION) DAILY
Qty: 60 EACH | Refills: 11 | Status: SHIPPED | OUTPATIENT
Start: 2024-09-10

## 2024-09-10 NOTE — PROGRESS NOTES
"9/10/2024    Chloe Dolan  New Patient Consult    Chief Complaint   Patient presents with    Abnormal Ct Scan       HPI:   9/10/2024 pt had fall 2 wks ago ppt er visit.  Ct chest with precarinal node 14 mm    Pt on Zoloft and Abilify.      Pt had increased edema to 3+ but better controlled on lasix...    Pt had odontoid fracture -- perhaps 3 yrs ago-- did not get dx at that time.        Has chr cough thick mucous.   Uses albuterol as needed with good result    Heart bypass yrs go     PFSH:  Past Medical History:   Diagnosis Date    Acute bacterial sinusitis 07/31/2024    Diabetes mellitus     Diverticular disease 07/31/2024    Dyspnea on exertion 10/10/2023    Edema 12/19/2023    Heart disease     History of coronary artery bypass graft 07/31/2024    Hyperlipidemia     Hypertension     Neuropathy          Past Surgical History:   Procedure Laterality Date    ARTERIAL BYPASS SURGRY      back surgey      CHOLECYSTECTOMY      TOTAL KNEE ARTHROPLASTY      WISDOM TOOTH EXTRACTION       Social History     Tobacco Use    Smoking status: Former     Current packs/day: 1.00     Types: Cigarettes   Substance Use Topics    Alcohol use: No    Drug use: No     Family History   Problem Relation Name Age of Onset    Stroke Mother      Heart attack Mother      Cancer Father      Diabetes Maternal Grandmother      Heart disease Maternal Grandmother      Breast cancer Cousin       Review of patient's allergies indicates:   Allergen Reactions    Adhesive Itching          Review of Systems:  a review of eleven systems covering constitutional, Eye, HEENT, Psych, Respiratory, Cardiac, GI, , Musculoskeletal, Endocrine, Dermatologic was negative except for pertinent findings as listed ABOVE and below: 30 lbs wt loss with poorly controlled diabetes...        Exam:Comprehensive exam done. /68 (BP Location: Right arm, Patient Position: Sitting, BP Method: Small (Automatic))   Pulse 83   Ht 5' 5.5" (1.664 m)   Wt 74.4 kg (164 lb " 0.4 oz)   SpO2 97% Comment: on room air at rest  BMI 26.88 kg/m²   Exam included Vitals as listed, and patient's appearance and affect and alertness and mood, oral exam for yeast and hygiene and pharynx lesions and Mallapatti (M) score, neck with inspection for jvd and masses and thyroid abnormalities and lymph nodes (supraclavicular and infraclavicular nodes and axillary also examined and noted if abn), chest exam included symmetry and effort and fremitus and percussion and auscultation, cardiac exam included rhythm and gallops and murmur and rubs and jvd and edema, abdominal exam for mass and hepatosplenomegaly and tenderness and hernias and bowel sounds, Musculoskeletal exam with muscle tone and posture and mobility/gait and  strength, and skin for rashes and cyanosis and pallor and turgor, extremity for clubbing.  Findings were normal except for pertinent findings listed below:  Hematoma left eye, chest is symmetric, no distress, normal percussion, normal fremitus and good normal breath sounds           Radiographs (ct chest and cxr) reviewed: results reviewed       Labs reviewed           PFT was not done       Plan:  Clinical impression is apparently straight forward and impression with management as below.     Chloe was seen today for abnormal ct scan.    Diagnoses and all orders for this visit:    Chronic cough  -     fluticasone-umeclidin-vilanter (TRELEGY ELLIPTA) 200-62.5-25 mcg inhaler; Inhale 1 puff into the lungs once daily.    Lymph node enlargement  -     Ambulatory referral/consult to Pulmonology    Chronic obstructive pulmonary disease, unspecified COPD type  -     fluticasone-umeclidin-vilanter (TRELEGY ELLIPTA) 200-62.5-25 mcg inhaler; Inhale 1 puff into the lungs once daily.    Frequent falls  -     SUBSEQUENT HOME HEALTH ORDERS        Follow up if symptoms worsen or fail to improve.    Discussed with patient above for education the following:      Patient Instructions   Lymph node not  pathologic size -- follow up not needed.  Big issue is falls      Diabetes not well controlled-- likely contributes to falls.      Falls has been significant with odontoid fracture in past.    Osteoporosis increase risk falls fracture..    Would ask home health to eval and treat to prevent falls.    Use trelegy -- consider continuing..  Evaluation took 32 minutes

## 2024-09-10 NOTE — PATIENT INSTRUCTIONS
Lymph node not pathologic size -- follow up not needed.  Big issue is falls      Diabetes not well controlled-- likely contributes to falls.      Falls has been significant with odontoid fracture in past.    Osteoporosis increase risk falls fracture..    Would ask home health to eval and treat to prevent falls.    Use trelegy -- consider continuing..

## 2024-09-12 DIAGNOSIS — E11.9 TYPE 2 DIABETES MELLITUS WITHOUT COMPLICATION: ICD-10-CM

## 2024-09-22 NOTE — PROGRESS NOTES
SUBJECTIVE:    Patient ID:  Chloe Dolan is a 68 y.o. female who presents for evaluation of CAD, CHF, AF      Medical history:  Paroxysmal atrial fibrillation  HFpEF  CAD s/p CABG  HTN  HLD  DM type 2 (A1c 8)  Delusions    [place] [place].  She has a documented history of AF per outside cardiology notes. Not on anticoagulation?  Recent ED visit for fall with facial trauma. Hyperglycemic >400. Not in DKA. No Syncope  All ECGs in system reviewed    Holter 9/23: overall remarkable for significant tachy or bradyarrhythmias  [facility] 2019: patent LIMA-D1 and into the mid LAD, patent SVG-LCx, occluded SVG  TTE 8/23: LVEF 65%  SPECT 2022: negative    Relevant labs: cr 0.8, TSH 3  Relevant Rx: Toprol 50 mg QD,  mg QD, Lasix 40 mg QD, losartan 50 mg BID, lipitor 40 mg QD    In clinic today:  Denies chest pain or SOB  Taking HCTZ for swelling  Taking lasix 40 mg every day  Reports having AF after surgery    I personally reviewed the ECG/telemetry strip today. My interpretation is NSR with HR 65 bpm.     Past Medical History:   Diagnosis Date    Acute bacterial sinusitis 07/31/2024    Diabetes mellitus     Diverticular disease 07/31/2024    Dyspnea on exertion 10/10/2023    Edema 12/19/2023    Heart disease     History of coronary artery bypass graft 07/31/2024    Hyperlipidemia     Hypertension     Neuropathy        Past Surgical History:   Procedure Laterality Date    ARTERIAL BYPASS SURGRY      back surgey      CHOLECYSTECTOMY      TOTAL KNEE ARTHROPLASTY      WISDOM TOOTH EXTRACTION         Family History   Problem Relation Name Age of Onset    Stroke Mother      Heart attack Mother      Cancer Father      Diabetes Maternal Grandmother      Heart disease Maternal Grandmother      Breast cancer Cousin         Social History     Socioeconomic History    Marital status:    Tobacco Use    Smoking status: Former     Current packs/day: 1.00     Types:  Cigarettes   Substance and Sexual Activity    Alcohol use: No    Drug use: No    Sexual activity: Yes     Partners: Male     Comment:      Social Determinants of Health     Financial Resource Strain: Medium Risk (7/15/2024)    Overall Financial Resource Strain (CARDIA)     Difficulty of Paying Living Expenses: Somewhat hard   Food Insecurity: Food Insecurity Present (7/15/2024)    Hunger Vital Sign     Worried About Running Out of Food in the Last Year: Sometimes true     Ran Out of Food in the Last Year: Never true   Transportation Needs: No Transportation Needs (2/27/2024)    Received from [facility] - Transportation     Lack of Transportation (Medical): No     Lack of Transportation (Non-Medical): No   Physical Activity: Inactive (7/15/2024)    Exercise Vital Sign     Days of Exercise per Week: 0 days     Minutes of Exercise per Session: 0 min   Stress: Stress Concern Present (7/15/2024)    [employer] [place] [employer] - Occupational Stress Questionnaire     Feeling of Stress : Rather much   Housing Stability: Unknown (7/15/2024)    Housing Stability Vital Sign     Unable to Pay for Housing in the Last Year: No       Review of patient's allergies indicates:   Allergen Reactions    Adhesive Itching       Review of Systems   Constitutional: Negative for chills and fever.   HENT:  Negative for congestion and hearing loss.    Eyes:  Negative for blurred vision and double vision.   Cardiovascular:         See HPI   Respiratory:  Negative for cough, hemoptysis and shortness of breath.    Endocrine: Negative for cold intolerance and heat intolerance.   Musculoskeletal:  Negative for joint pain and joint swelling.   Gastrointestinal:  Negative for abdominal pain, nausea and vomiting.   Genitourinary:  Negative for dysuria and hematuria.   Neurological:  Negative for focal weakness and headaches.   Psychiatric/Behavioral:  Negative for altered mental status.    All other systems reviewed and  are negative.           OBJECTIVE:     Vitals:    09/23/24 0854   BP: (!) 91/57   Pulse: 61         BP Readings from Last 5 Encounters:   09/10/24 108/68   09/03/24 108/62   08/27/24 (!) 114/54   08/20/24 (!) 151/64   08/13/24 138/63        Physical Exam  Vitals and nursing note reviewed.   Constitutional:       General: She is not in acute distress.     Appearance: She is well-developed. She is not diaphoretic.   HENT:      Head: Normocephalic and atraumatic.   Eyes:      General: No scleral icterus.        Right eye: No discharge.         Left eye: No discharge.   Cardiovascular:      Rate and Rhythm: Normal rate and regular rhythm. No extrasystoles are present.     Pulses: Normal pulses and intact distal pulses.           Radial pulses are 2+ on the right side and 2+ on the left side.      Heart sounds: Normal heart sounds, S1 normal and S2 normal. Heart sounds not distant. No opening snap. No murmur heard.     No friction rub. No gallop. No S3 or S4 sounds.   Pulmonary:      Effort: Pulmonary effort is normal. No respiratory distress.      Breath sounds: No wheezing or rales.   Abdominal:      General: Bowel sounds are normal. There is no distension.      Palpations: Abdomen is soft.      Tenderness: There is no abdominal tenderness.   Musculoskeletal:         General: No deformity. Normal range of motion.      Cervical back: Normal range of motion and neck supple.      Comments: 1+ edema bilaterally   Skin:     General: Skin is warm and dry.      Findings: No erythema or rash.   Neurological:      Mental Status: She is alert.             Current Outpatient Medications   Medication Instructions    albuterol (PROVENTIL/VENTOLIN HFA) 90 mcg/actuation inhaler 2 puffs, Inhalation, Every 4 hours PRN    ARIPiprazole (ABILIFY) 5 mg, Oral, Daily    aspirin 325 MG tablet 1 tablet, Oral, Daily    atorvastatin (LIPITOR) 40 mg, Oral, Nightly    blood glucose control, low (TRUE METRIX LEVEL 1) Soln use as directed    blood  "sugar diagnostic Strp 1 strip, 2 times daily    blood-glucose meter Misc Use daily    chlorthalidone (HYGROTEN) 25 MG Tab Take one-half tablet (12.5 mg total) by mouth once daily.    fluconazole (DIFLUCAN) 150 MG Tab Take one tablet by mouth one time at end of doxycycline for anticipated medication adverse effect of vaginal candidiasis. If no improvement may repeat in three days.    fluticasone-umeclidin-vilanter (TRELEGY ELLIPTA) 200-62.5-25 mcg inhaler 1 puff, Inhalation, Daily    furosemide (LASIX) 40 MG tablet TAKE 1 TABLET BY MOUTH DAILY AS NEEDED FOR SWELLING    gabapentin (NEURONTIN) 300 MG capsule Take one capsule by mouth in the morning, one capsule by mouth midday, and two capsules in the evening.    HYDROcodone-acetaminophen (NORCO) 5-325 mg per tablet 1 tablet, Oral, Every 6 hours PRN    ipratropium (ATROVENT) 21 mcg (0.03 %) nasal spray 2 sprays, Each Nostril, 2 times daily    isosorbide mononitrate (IMDUR) 30 MG 24 hr tablet 1 tablet, Oral, Every morning    JARDIANCE 25 mg, Oral, Daily    lancets Misc 2 times daily    loperamide (IMODIUM) 2 mg capsule 1 capsule, Oral, As needed (PRN)    losartan (COZAAR) 50 mg, Oral, Daily    metFORMIN (GLUCOPHAGE) 500 MG tablet Take one tab by mouth at bedtime for one week. Week 2 take one tab by mouth twice daily. Week 3 take one tab by mouth every morning and two tabs by mouth at bedtime. Week 4 take two tabs by mouth 2 times daily. If diarrhea develops, go back down to last tolerated dose.    metoprolol succinate (TOPROL-XL) 50 mg, 2 times daily    mirabegron (MYRBETRIQ) 50 mg Tb24 1 tablet, Daily    sertraline (ZOLOFT) 50 mg, Oral, Daily    sertraline (ZOLOFT) 100 mg, Oral, Nightly    trospium (SANCTURA) 20 mg, Oral, 2 times daily       Lipid Panel:   No results found for: "CHOL", "HDL", "LDLCALC", "TRIG", "CHOLHDL"      The ASCVD Risk score (Melissa DK, et al., 2019) failed to calculate for the following reasons:    Cannot find a previous HDL lab    Cannot find a " previous total cholesterol lab    Most Recent EKG Results  Results for orders placed or performed during the hospital encounter of 08/27/24   EKG 12-lead    Collection Time: 08/27/24  7:43 PM   Result Value Ref Range    QRS Duration 82 ms    [facility] QTC Calculation 442 ms    Narrative    Test Reason : W19.XXXA,    Vent. Rate : 072 BPM     Atrial Rate : 072 BPM     P-R Int : 138 ms          QRS Dur : 082 ms      QT Int : 404 ms       P-R-T Axes : 042 -06 007 degrees     QTc Int : 442 ms    Normal sinus rhythm  LVH with repolarization abnormality  Abnormal ECG  When compared with ECG of 18-JUN-2024 19:07,  Borderline criteria for Anterior infarct are no longer Present  No significant change was found  Confirmed by Remberto Jaimes MD ([id number]) on 8/29/2024 11:39:11 AM    Referred By: AMBER   SELF           Confirmed By:Remberto Jaimes MD       Most Recent Echocardiogram Results  No results found for this or any previous visit.      Most Recent Nuclear Stress Test Results  No results found for this or any previous visit.      Most Recent Cardiac PET Stress Test Results  No results found for this or any previous visit.      Most Recent Cardiovascular Angiogram results  No results found for this or any previous visit.      Other Most Recent Cardiology Results  Results for orders placed during the hospital encounter of 08/27/24    Cardiac monitoring strips        All pertinent data including labs, imaging, EKGs, and studies listed above were reviewed.  All EKG tracing in [facility] were personally interpreted by this provider.    ASSESSMENT:     1. Paroxysmal atrial fibrillation        2. Chronic heart failure with preserved ejection fraction        3. Coronary atherosclerosis of autologous bypass graft        4. Chronic ischemic heart disease        5. CAD S/P percutaneous coronary angioplasty        6. Arteriosclerosis of coronary artery        7. Primary hypertension          PLAN FOR TREATMENT OF ABOVE DIAGNOSES:     Obtain  records for [facility]  Decrease ASA to 81 mg daily  14 day event monitor. Obtain records to see if there is clear evidence of AF. Hold OAC at this time. If monitor negative, would recommend ILR implantation if holding OAC as she is not on appropriate CVA ppx for AF.  Continue Lipitor 40 mg daily  Continue Jardiance 10 mg daily  Continue lasix 40 mg daily. Discussed weighing herself daily in the morning, and only increasing lasix to BID PRN if gains 2-3 lbs in 2 days or 5 lbs  in 1 week  Discussed importance of low sodium diet      F/u in 6 weeks    Israel Chen MD  Cardiac Electrophysiology

## 2024-09-23 ENCOUNTER — OFFICE VISIT (OUTPATIENT)
Dept: CARDIOLOGY | Facility: CLINIC | Age: 69
End: 2024-09-23
Payer: MEDICARE

## 2024-09-23 VITALS
HEIGHT: 65 IN | SYSTOLIC BLOOD PRESSURE: 91 MMHG | DIASTOLIC BLOOD PRESSURE: 57 MMHG | WEIGHT: 167.75 LBS | BODY MASS INDEX: 27.95 KG/M2 | HEART RATE: 61 BPM

## 2024-09-23 DIAGNOSIS — I10 PRIMARY HYPERTENSION: ICD-10-CM

## 2024-09-23 DIAGNOSIS — I25.9 CHRONIC ISCHEMIC HEART DISEASE: ICD-10-CM

## 2024-09-23 DIAGNOSIS — I25.10 CAD S/P PERCUTANEOUS CORONARY ANGIOPLASTY: ICD-10-CM

## 2024-09-23 DIAGNOSIS — I48.0 PAROXYSMAL ATRIAL FIBRILLATION: Primary | ICD-10-CM

## 2024-09-23 DIAGNOSIS — I25.810 CORONARY ATHEROSCLEROSIS OF AUTOLOGOUS BYPASS GRAFT: ICD-10-CM

## 2024-09-23 DIAGNOSIS — F22 DELUSIONS: ICD-10-CM

## 2024-09-23 DIAGNOSIS — I50.9 CONGESTIVE HEART FAILURE, UNSPECIFIED HF CHRONICITY, UNSPECIFIED HEART FAILURE TYPE: ICD-10-CM

## 2024-09-23 DIAGNOSIS — Z98.61 CAD S/P PERCUTANEOUS CORONARY ANGIOPLASTY: ICD-10-CM

## 2024-09-23 DIAGNOSIS — I50.32 CHRONIC HEART FAILURE WITH PRESERVED EJECTION FRACTION: ICD-10-CM

## 2024-09-23 DIAGNOSIS — I25.10 ARTERIOSCLEROSIS OF CORONARY ARTERY: ICD-10-CM

## 2024-09-23 LAB
OHS QRS DURATION: 82 MS
OHS QTC CALCULATION: 418 MS

## 2024-09-23 PROCEDURE — 3008F BODY MASS INDEX DOCD: CPT | Mod: CPTII,S$GLB,, | Performed by: INTERNAL MEDICINE

## 2024-09-23 PROCEDURE — 3074F SYST BP LT 130 MM HG: CPT | Mod: CPTII,S$GLB,, | Performed by: INTERNAL MEDICINE

## 2024-09-23 PROCEDURE — 93010 ELECTROCARDIOGRAM REPORT: CPT | Mod: S$GLB,,, | Performed by: INTERNAL MEDICINE

## 2024-09-23 PROCEDURE — 4010F ACE/ARB THERAPY RXD/TAKEN: CPT | Mod: CPTII,S$GLB,, | Performed by: INTERNAL MEDICINE

## 2024-09-23 PROCEDURE — 3078F DIAST BP <80 MM HG: CPT | Mod: CPTII,S$GLB,, | Performed by: INTERNAL MEDICINE

## 2024-09-23 PROCEDURE — 1159F MED LIST DOCD IN RCRD: CPT | Mod: CPTII,S$GLB,, | Performed by: INTERNAL MEDICINE

## 2024-09-23 PROCEDURE — 93005 ELECTROCARDIOGRAM TRACING: CPT | Mod: PO

## 2024-09-23 PROCEDURE — 1100F PTFALLS ASSESS-DOCD GE2>/YR: CPT | Mod: CPTII,S$GLB,, | Performed by: INTERNAL MEDICINE

## 2024-09-23 PROCEDURE — 1160F RVW MEDS BY RX/DR IN RCRD: CPT | Mod: CPTII,S$GLB,, | Performed by: INTERNAL MEDICINE

## 2024-09-23 PROCEDURE — 1125F AMNT PAIN NOTED PAIN PRSNT: CPT | Mod: CPTII,S$GLB,, | Performed by: INTERNAL MEDICINE

## 2024-09-23 PROCEDURE — 99204 OFFICE O/P NEW MOD 45 MIN: CPT | Mod: S$GLB,,, | Performed by: INTERNAL MEDICINE

## 2024-09-23 PROCEDURE — 3288F FALL RISK ASSESSMENT DOCD: CPT | Mod: CPTII,S$GLB,, | Performed by: INTERNAL MEDICINE

## 2024-09-23 PROCEDURE — 99999 PR PBB SHADOW E&M-EST. PATIENT-LVL V: CPT | Mod: PBBFAC,,, | Performed by: INTERNAL MEDICINE

## 2024-09-23 RX ORDER — ASPIRIN 81 MG/1
81 TABLET ORAL DAILY
Start: 2024-09-23 | End: 2025-09-23

## 2024-09-23 RX ORDER — SERTRALINE HYDROCHLORIDE 100 MG/1
100 TABLET, FILM COATED ORAL NIGHTLY
Qty: 30 TABLET | Refills: 0 | Status: SHIPPED | OUTPATIENT
Start: 2024-09-23 | End: 2025-09-23

## 2024-09-25 ENCOUNTER — TELEPHONE (OUTPATIENT)
Dept: GASTROENTEROLOGY | Facility: CLINIC | Age: 69
End: 2024-09-25
Payer: MEDICARE

## 2024-09-25 NOTE — TELEPHONE ENCOUNTER
Several attempts have been made to contact pt to schedule ordered procedure. Case request canceled. Letter placed in mail. Pt will be scheduled from this request upon call back

## 2024-09-26 ENCOUNTER — HOSPITAL ENCOUNTER (OUTPATIENT)
Dept: CARDIOLOGY | Facility: HOSPITAL | Age: 69
Discharge: HOME | End: 2024-09-26
Attending: INTERNAL MEDICINE
Payer: MEDICARE

## 2024-09-26 DIAGNOSIS — I48.0 PAROXYSMAL ATRIAL FIBRILLATION: ICD-10-CM

## 2024-09-26 PROCEDURE — 93246 EXT ECG>7D<15D RECORDING: CPT | Mod: PO

## 2024-09-29 DIAGNOSIS — F22 DELUSIONS: ICD-10-CM

## 2024-09-29 RX ORDER — ARIPIPRAZOLE 5 MG/1
5 TABLET ORAL DAILY
Qty: 30 TABLET | Refills: 0 | Status: CANCELLED | OUTPATIENT
Start: 2024-09-29

## 2024-09-30 ENCOUNTER — PATIENT OUTREACH (OUTPATIENT)
Dept: PSYCHIATRY | Facility: CLINIC | Age: 69
End: 2024-09-30
Payer: MEDICARE

## 2024-09-30 ENCOUNTER — PATIENT MESSAGE (OUTPATIENT)
Dept: PSYCHIATRY | Facility: CLINIC | Age: 69
End: 2024-09-30
Payer: MEDICARE

## 2024-09-30 DIAGNOSIS — F22 DELUSIONS: ICD-10-CM

## 2024-09-30 DIAGNOSIS — F32.89 OTHER DEPRESSION: ICD-10-CM

## 2024-09-30 DIAGNOSIS — F41.9 ANXIETY: ICD-10-CM

## 2024-09-30 DIAGNOSIS — F22 DELUSIONS: Primary | ICD-10-CM

## 2024-09-30 RX ORDER — ARIPIPRAZOLE 5 MG/1
5 TABLET ORAL DAILY
Qty: 30 TABLET | Refills: 0 | Status: SHIPPED | OUTPATIENT
Start: 2024-09-30

## 2024-10-01 ENCOUNTER — OFFICE VISIT (OUTPATIENT)
Dept: NEUROLOGY | Facility: CLINIC | Age: 69
End: 2024-10-01
Payer: MEDICARE

## 2024-10-01 VITALS
SYSTOLIC BLOOD PRESSURE: 138 MMHG | WEIGHT: 163.5 LBS | DIASTOLIC BLOOD PRESSURE: 81 MMHG | HEART RATE: 87 BPM | RESPIRATION RATE: 17 BRPM | BODY MASS INDEX: 27.2 KG/M2

## 2024-10-01 DIAGNOSIS — F32.89 OTHER DEPRESSION: ICD-10-CM

## 2024-10-01 DIAGNOSIS — R41.3 OTHER AMNESIA: ICD-10-CM

## 2024-10-01 DIAGNOSIS — R41.3 MEMORY LOSS: ICD-10-CM

## 2024-10-01 DIAGNOSIS — F22 DELUSION: Primary | ICD-10-CM

## 2024-10-01 PROCEDURE — 99999 PR PBB SHADOW E&M-EST. PATIENT-LVL V: CPT | Mod: PBBFAC,,, | Performed by: NURSE PRACTITIONER

## 2024-10-01 NOTE — PROGRESS NOTES
NEUROLOGY  Outpatient Consultation Visit     [facility]  [address] [facility], [place] [address]  [phone] (office) / [phone] (fax)    Patient Name:  Chloe Dolan  :  1955  MR #:  [id number]  Acct #:  [id number]    Date of Neurology Consult: 10/04/2024  Name of Provider: ARELY Mas    Other Physicians:  Floresita Dominguez DO (Primary Care Physician); Floresita Dominguez DO (Referring)    Chief Complaint: Memory Loss      History of Present Illness (HPI):  Chloe Dolan is a left handed  68 y.o. female with a PMHX of HTN, HLD, DM, DIXON, neuropathy, afib      Patient presents today for memory evaluation. She is accompanied by daughter who helps supply information. Patient reports poor short term memory loss. She is currently living with her daughter as she is going through a divorce.     Patient's highest level of education completed was highschool. She was an automobile . The onset of memory issues began in the last year. She struggles more with short memory loss. Daughter does endorse patient to have personality changes. Daughter states the patient believes she is talking to an actor but is being scammed. Patient states she has been scammed and the current person she is speaking to is different from the previous scammer.   She has reportedly given her info to this [facility] as well as her 401k. Daughter states she has done this numerous times. She has given the [facility] her daughters and grandchildren information. She has sent this person pictures of her family. Patient reportedly has been committed multiples times followed by outpatient therapy.  In  she went to [facility] for 1.5 weeks and was released. She has never followed back up with the psychiatrist or been consistent with suggested medications. Once discharged from inpatient psych, she immediately contacted the [facility] again. Her daughter is kicking her out in 3 weeks as she is concerned about  her safety since the scammer has her address.  She is banned from applying for loans as per her divorce and still continues to do so. She continues to believes she is speaking to the actor and not a scammer. Family has also tried calling the FBI but nothing has been done since this person is being tracked out of the [place].   Patient does admit to depression and takes Abilify and Zoloft. She denies hallucinations.   Her sleep is not good.   She takes Gabapentin for chronic back pain and daughter believes she is taking too much and using it for sleep.   Daughter is managing her medications.  Patient is managing her finances. She is no longer driving. Her last fall was several weeks ago.       Past Medical, Surgical, Family & Social History:   Past Medical History:   Diagnosis Date    Acute bacterial sinusitis 07/31/2024    Diabetes mellitus     Diverticular disease 07/31/2024    Dyspnea on exertion 10/10/2023    Edema 12/19/2023    Heart disease     History of coronary artery bypass graft 07/31/2024    Hyperlipidemia     Hypertension     Neuropathy      Past Surgical History:   Procedure Laterality Date    ARTERIAL BYPASS SURGRY      back surgey      CHOLECYSTECTOMY      TOTAL KNEE ARTHROPLASTY      WISDOM TOOTH EXTRACTION       Family History   Problem Relation Name Age of Onset    Stroke Mother      Heart attack Mother      Cancer Father      Diabetes Maternal Grandmother      Heart disease Maternal Grandmother      Breast cancer Cousin       Alcohol use:  reports no history of alcohol use.   (Of note, 0.6 oz = 1 beer or 6 oz = 10 beers).  Tobacco use:  reports that she has quit smoking. Her smoking use included cigarettes. She does not have any smokeless tobacco history on file.  Street drug use:  reports no history of drug use.  Allergies: Adhesive.    Home Medications:     Current Outpatient Medications:     albuterol (PROVENTIL/VENTOLIN HFA) 90 mcg/actuation inhaler, Inhale 2 puffs into the lungs every 4 (four)  hours as needed., Disp: , Rfl:     ARIPiprazole (ABILIFY) 5 MG Tab, Take 1 tablet (5 mg total) by mouth once daily., Disp: 30 tablet, Rfl: 0    aspirin (ECOTRIN) 81 MG EC tablet, Take 1 tablet (81 mg total) by mouth once daily., Disp: , Rfl:     atorvastatin (LIPITOR) 40 MG tablet, Take 1 tablet (40 mg total) by mouth every evening., Disp: 90 tablet, Rfl: 3    blood glucose control, low (TRUE METRIX LEVEL 1) Soln, use as directed, Disp: , Rfl:     blood sugar diagnostic Strp, 1 strip 2 (two) times a day., Disp: , Rfl:     blood-glucose meter Misc, Use daily, Disp: , Rfl:     fluconazole (DIFLUCAN) 150 MG Tab, Take one tablet by mouth one time at end of doxycycline for anticipated medication adverse effect of vaginal candidiasis. If no improvement may repeat in three days., Disp: 1 tablet, Rfl: 1    fluticasone-umeclidin-vilanter (TRELEGY ELLIPTA) 200-62.5-25 mcg inhaler, Inhale 1 puff into the lungs once daily., Disp: 60 each, Rfl: 11    furosemide (LASIX) 40 MG tablet, TAKE 1 TABLET BY MOUTH DAILY AS NEEDED FOR SWELLING, Disp: , Rfl:     gabapentin (NEURONTIN) 300 MG capsule, Take one capsule by mouth in the morning, one capsule by mouth midday, and two capsules in the evening., Disp: 540 capsule, Rfl: 1    ipratropium (ATROVENT) 21 mcg (0.03 %) nasal spray, 2 sprays by Each Nostril route 2 (two) times daily., Disp: , Rfl:     isosorbide mononitrate (IMDUR) 30 MG 24 hr tablet, Take 1 tablet by mouth every morning., Disp: , Rfl:     JARDIANCE 25 mg tablet, Take 25 mg by mouth once daily., Disp: , Rfl:     lancets Misc, 2 (two) times daily., Disp: , Rfl:     loperamide (IMODIUM) 2 mg capsule, Take 1 capsule by mouth as needed., Disp: , Rfl:     losartan (COZAAR) 50 MG tablet, Take 50 mg by mouth once daily., Disp: , Rfl:     metFORMIN (GLUCOPHAGE) 500 MG tablet, Take one tab by mouth at bedtime for one week. Week 2 take one tab by mouth twice daily. Week 3 take one tab by mouth every morning and two tabs by mouth at  bedtime. Week 4 take two tabs by mouth 2 times daily. If diarrhea develops, go back down to last tolerated dose., Disp: 180 tablet, Rfl: 3    metoprolol succinate (TOPROL-XL) 50 MG 24 hr tablet, 50 mg 2 (two) times daily., Disp: , Rfl: 3    mirabegron (MYRBETRIQ) 50 mg Tb24, Take 1 tablet by mouth once daily., Disp: , Rfl:     sertraline (ZOLOFT) 100 MG tablet, Take 1 tablet (100 mg total) by mouth every evening., Disp: 30 tablet, Rfl: 0    sertraline (ZOLOFT) 50 MG tablet, Take 1 tablet (50 mg total) by mouth once daily., Disp: 30 tablet, Rfl: 11    trospium (SANCTURA) 20 mg Tab tablet, Take 1 tablet (20 mg total) by mouth 2 (two) times daily., Disp: 60 tablet, Rfl: 11    Physical Examination:  /81 (BP Location: Right arm, Patient Position: Sitting)   Pulse 87   Resp 17   Wt 74.2 kg (163 lb 7.5 oz)   BMI 27.20 kg/m²               GENERAL:  General appearance: Well, non-toxic appearing.  No apparent distress.  Neck: supple.    MENTAL STATUS:  Alertness, attention span & concentration: normal.  Language: normal.  Orientation to self, place & time:  normal.  Memory, recent & remote: normal.  Fund of knowledge: normal.  MMSE: 28/30    SPEECH:  Clear and fluent.  Follows complex commands.      CRANIAL NERVES:  Cranial Nerves II-XII were examined.  II - Visual fields: normal.  III, IV, VI: PERRL, EOMI, No ptosis, No nystagmus.  V - Facial sensation: normal.  VII - Face symmetry & mobility: normal.  VIII - Hearing: normal  IX, X - Palate: mobile & midline.  XI - Shoulder shrug: normal.  XII - Tongue protrusion: normal.        GROSS MOTOR:  Gait & station: non focal   Tone: normal.  Abnormal movements: none.  Finger-nose: normal.  Rapid alternating movements: normal.  Pronator drift: normal      MUSCLE STRENGTH:     RIGHT    LEFT   5 Deltoids 5   5 Biceps 5   5 Triceps 5   5 Forearm.Pr. 5        5 Iliopsoas flex    5   5 Hip Abduct 5   5 Hip Adduct 5   5 Quads 5   5 Hams 5   5 Dorsiflex 5   5 Plantar Flex 5      Diagnostic Data Reviewed:   I have personally reviewed provider notes, labs and imaging made available to me today.     Imaging:    CT Head Without Contrast 8/2024    Narrative  EXAMINATION:  CT HEAD WITHOUT CONTRAST    CLINICAL HISTORY:  Fall.  Head trauma.    TECHNIQUE:  Axial CT images were obtained of the brain without intravenous contrast.  Coronal and sagittal reformations were obtained.  Automated exposure control utilized to reduce radiation dose.  Total exam DLP is 1622 mGy cm.    COMPARISON:  None.    FINDINGS:  There is left frontal scalp soft tissue swelling, hematoma, laceration extending inferiorly to the periorbital soft tissues.  Gray-white matter differentiation is within normal limits. There is chronic involutional change.  There is chronic white matter microischemic change.  There is intracranial atherosclerosis.  No acute intracranial hemorrhage, extra-axial fluid collection, hydrocephalus, mass effect, or midline shift is noted.  No large vessel territory acute ischemia is identified.  Paranasal sinuses and facial bones will be discussed on dedicated facial CT.  Visualized mastoid air cells are clear.  No acute displaced calvarial fracture is identified.    Impression  1. Left frontal scalp laceration with edema and hematoma.  2. No acute intracranial abnormalities identified.      Labs:  Lab Results   Component Value Date    WBC 7.96 08/27/2024    HGB 13.3 08/27/2024    HCT 38.1 08/27/2024     (L) 08/27/2024    MCV 85 08/27/2024    RDW 14.1 08/27/2024     Lab Results   Component Value Date     08/27/2024    K 3.5 08/27/2024    CL 98 08/27/2024    CO2 28 08/27/2024    BUN 12 08/27/2024    CREATININE 0.81 08/27/2024     (HH) 08/27/2024    CALCIUM 9.2 08/27/2024     Lab Results   Component Value Date    PROT 6.9 08/27/2024    ALBUMIN 3.9 08/27/2024    BILITOT 0.9 08/27/2024    AST 36 08/27/2024    ALKPHOS 70 08/27/2024    ALT 27 08/27/2024     Lab Results   Component Value  "Date    INR 1.1 07/18/2024     No results found for: "CHOL", "HDL", "LDLCALC", "TRIG", "CHOLHDL"  No results found for: "LABA1C", "HGBA1C"   No results found for: "YGEVSTDU80"  No results found for: "FOLATE"  Lab Results   Component Value Date    TSH 3.001 06/29/2024     No results found for: "VITAMINB1"  No results found for: "RPR"  No results found for: "ANUPAMA"  No components found for: "HEPATITISCANTIBODY"  No components found for: "HIV 1/2 AG/AB"  No results found for: "CRP"  No components found for: "SEDIMENTATIONRATE"      Assessment and Plan:  Chloe Dolan is a 68 y.o. female.    Problem List Items Addressed This Visit          Neuro    Memory loss       Psychiatric    Delusion - Primary    Current Assessment & Plan     Patient believing a person she is speaking with is an actor  She is being scammed out of money and giving this person a lot of her personal information including copies of signature, address etc.   Daughter reports the pt has been committed to inpt psych at least 3 times but was told she is not psychotic. She has never consistently followed up with psych as outpatient or with recommended medications.   Patient does admit to depression and anxiety. She was tearful on exam today. No hallucinations.   MMSE today 28/30   - suspect delusions are from psychiatric etiology rather than emerging neurodegenerative disorder.   Obtain MRI brain scan and serologies  Referral placed to neuro psych for further testing as well as case management   Behavioral health suggesting pt have IOP.   Pt was very accepting of treatment but daughter continued to report that her mother is very manipulative and will likely not go through with all testing.          Depression     Other Visit Diagnoses       Other amnesia                      Important to note, also  has a past medical history of Acute bacterial sinusitis (07/31/2024), Diabetes mellitus, Diverticular disease (07/31/2024), Dyspnea on exertion (10/10/2023), " Edema (12/19/2023), Heart disease, History of coronary artery bypass graft (07/31/2024), Hyperlipidemia, Hypertension, and Neuropathy.            The patient will return to clinic in 6 months.        All questions were answered and patient is comfortable with the plan.       Thank you very much for the opportunity to assist in this patient's care.    If you have any questions or concerns, please do not hesitate to contact me at any time.    Sincerely,     ARELY Mas  [facility] [place]         KATIE spent a total of 51 minutes on the day of the visit.This includes face to face time and non-face to face time preparing to see the patient (eg, review of tests), Obtaining and/or reviewing separately obtained history, Documenting clinical information in the electronic or other health record, Independently interpreting resultsand communicating results to the patient/family/caregiver, or Care coordination.

## 2024-10-02 ENCOUNTER — TELEPHONE (OUTPATIENT)
Dept: PSYCHIATRY | Facility: CLINIC | Age: 69
End: 2024-10-02
Payer: MEDICARE

## 2024-10-02 ENCOUNTER — PATIENT MESSAGE (OUTPATIENT)
Dept: NEUROLOGY | Facility: CLINIC | Age: 69
End: 2024-10-02
Payer: MEDICARE

## 2024-10-03 ENCOUNTER — PATIENT MESSAGE (OUTPATIENT)
Dept: NEUROLOGY | Facility: CLINIC | Age: 69
End: 2024-10-03
Payer: MEDICARE

## 2024-10-04 NOTE — ASSESSMENT & PLAN NOTE
Patient believing a person she is speaking with is an actor  She is being scammed out of money and giving this person a lot of her personal information including copies of signature, address etc.   Daughter reports the pt has been committed to inpt psych at least 3 times but was told she is not psychotic. She has never consistently followed up with psych as outpatient or with recommended medications.   Patient does admit to depression and anxiety. She was tearful on exam today. No hallucinations.   MMSE today 28/30   - suspect delusions are from psychiatric etiology rather than emerging neurodegenerative disorder.   Obtain MRI brain scan and serologies  Referral placed to neuro psych for further testing as well as case management   Behavioral health suggesting pt have IOP.   Pt was very accepting of treatment but daughter continued to report that her mother is very manipulative and will likely not go through with all testing.

## 2024-10-07 ENCOUNTER — TELEPHONE (OUTPATIENT)
Dept: NEUROLOGY | Facility: CLINIC | Age: 69
End: 2024-10-07
Payer: MEDICARE

## 2024-10-11 ENCOUNTER — TELEPHONE (OUTPATIENT)
Dept: NEUROLOGY | Facility: CLINIC | Age: 69
End: 2024-10-11
Payer: MEDICARE

## 2024-10-11 NOTE — TELEPHONE ENCOUNTER
LVM for daughter indicating I had information on an IOP they may be interested in for the pt. [facility] ph: [phone]

## 2024-10-17 ENCOUNTER — HOSPITAL ENCOUNTER (OUTPATIENT)
Dept: RADIOLOGY | Facility: HOSPITAL | Age: 69
Discharge: HOME | End: 2024-10-17
Attending: NURSE PRACTITIONER
Payer: MEDICARE

## 2024-10-17 DIAGNOSIS — R41.3 MEMORY LOSS: ICD-10-CM

## 2024-10-17 DIAGNOSIS — R41.3 OTHER AMNESIA: ICD-10-CM

## 2024-10-17 PROCEDURE — 70551 MRI BRAIN STEM W/O DYE: CPT | Mod: 26,,, | Performed by: RADIOLOGY

## 2024-10-17 PROCEDURE — 70551 MRI BRAIN STEM W/O DYE: CPT | Mod: TC,PO

## 2024-10-22 ENCOUNTER — PATIENT MESSAGE (OUTPATIENT)
Dept: FAMILY MEDICINE | Facility: CLINIC | Age: 69
End: 2024-10-22
Payer: MEDICARE

## 2024-10-23 ENCOUNTER — PATIENT OUTREACH (OUTPATIENT)
Dept: ADMINISTRATIVE | Facility: OTHER | Age: 69
End: 2024-10-23
Payer: MEDICARE

## 2024-10-23 ENCOUNTER — TELEPHONE (OUTPATIENT)
Dept: FAMILY MEDICINE | Facility: CLINIC | Age: 69
End: 2024-10-23
Payer: MEDICARE

## 2024-10-23 ENCOUNTER — PATIENT MESSAGE (OUTPATIENT)
Dept: ADMINISTRATIVE | Facility: HOSPITAL | Age: 69
End: 2024-10-23
Payer: MEDICARE

## 2024-10-23 DIAGNOSIS — E11.40 TYPE 2 DIABETES MELLITUS WITH DIABETIC NEUROPATHY, WITHOUT LONG-TERM CURRENT USE OF INSULIN: Primary | ICD-10-CM

## 2024-10-23 RX ORDER — EMPAGLIFLOZIN 25 MG/1
25 TABLET, FILM COATED ORAL DAILY
Qty: 90 TABLET | Refills: 0 | Status: SHIPPED | OUTPATIENT
Start: 2024-10-23

## 2024-10-23 NOTE — TELEPHONE ENCOUNTER
----- Message from Jackelyn sent at 10/23/2024 10:40 AM CDT -----  Regarding: Call back  Type:  Needs Medical Advice    Who Called: Pt    Would the patient rather a call back or a response via [facility]? Call back    Best Call Back Number: [phone]    Additional Information: Pt sts she got a message in her portal but is not able to see it and is requesting a call back. Thank you

## 2024-10-23 NOTE — TELEPHONE ENCOUNTER
Called pt and she states JARDIANCE 25 mg tablet is still very expensive. Is there another medication that she can get?

## 2024-10-25 ENCOUNTER — TELEPHONE (OUTPATIENT)
Dept: FAMILY MEDICINE | Facility: CLINIC | Age: 69
End: 2024-10-25
Payer: MEDICARE

## 2024-10-25 NOTE — TELEPHONE ENCOUNTER
----- Message from Blayne sent at 10/25/2024  2:32 PM CDT -----  Regarding: return call  Contact: patient  Type:  Patient Returning Call    Who Called:patient  Who Left Message for Patient:Renay CHAVA  Does the patient know what this is regarding?:returning your call  Would the patient rather a call back or a response via [facility]?   Best Call Back Number:[phone]  Additional Information:

## 2024-10-25 NOTE — TELEPHONE ENCOUNTER
----- Message from Mellisa sent at 10/25/2024 12:46 PM CDT -----  Regarding: return call  Contact: patient  Type:  Patient Returning Call    Who Called:  patient  Who Left Message for Patient:  Nancy  Does the patient know what this is regarding?:  schedule  Best Call Back Number:  [phone]  Additional Information:  Please call patient to advise.  Thanks!

## 2024-10-26 DIAGNOSIS — R05.3 CHRONIC COUGH: ICD-10-CM

## 2024-10-26 DIAGNOSIS — F22 DELUSIONS: ICD-10-CM

## 2024-10-26 DIAGNOSIS — J44.9 CHRONIC OBSTRUCTIVE PULMONARY DISEASE, UNSPECIFIED COPD TYPE: ICD-10-CM

## 2024-10-28 ENCOUNTER — TELEPHONE (OUTPATIENT)
Dept: FAMILY MEDICINE | Facility: CLINIC | Age: 69
End: 2024-10-28
Payer: MEDICARE

## 2024-10-28 ENCOUNTER — PATIENT MESSAGE (OUTPATIENT)
Dept: FAMILY MEDICINE | Facility: CLINIC | Age: 69
End: 2024-10-28
Payer: MEDICARE

## 2024-10-28 RX ORDER — FLUTICASONE FUROATE, UMECLIDINIUM BROMIDE AND VILANTEROL TRIFENATATE 200; 62.5; 25 UG/1; UG/1; UG/1
1 POWDER RESPIRATORY (INHALATION) DAILY
Qty: 60 EACH | Refills: 11 | Status: SHIPPED | OUTPATIENT
Start: 2024-10-28 | End: 2024-10-29

## 2024-10-29 ENCOUNTER — OFFICE VISIT (OUTPATIENT)
Dept: FAMILY MEDICINE | Facility: CLINIC | Age: 69
End: 2024-10-29
Payer: MEDICARE

## 2024-10-29 ENCOUNTER — PATIENT MESSAGE (OUTPATIENT)
Dept: PSYCHIATRY | Facility: CLINIC | Age: 69
End: 2024-10-29
Payer: MEDICARE

## 2024-10-29 ENCOUNTER — TELEPHONE (OUTPATIENT)
Dept: UROLOGY | Facility: CLINIC | Age: 69
End: 2024-10-29
Payer: MEDICARE

## 2024-10-29 DIAGNOSIS — R09.81 NASAL CONGESTION: ICD-10-CM

## 2024-10-29 DIAGNOSIS — F41.9 ANXIETY: ICD-10-CM

## 2024-10-29 DIAGNOSIS — F22 DELUSION: ICD-10-CM

## 2024-10-29 DIAGNOSIS — N39.0 RECURRENT UTI: ICD-10-CM

## 2024-10-29 DIAGNOSIS — R30.0 DYSURIA: ICD-10-CM

## 2024-10-29 DIAGNOSIS — R32 URINARY INCONTINENCE, UNSPECIFIED TYPE: Primary | ICD-10-CM

## 2024-10-29 DIAGNOSIS — M06.9 RHEUMATOID ARTHRITIS, INVOLVING UNSPECIFIED SITE, UNSPECIFIED WHETHER RHEUMATOID FACTOR PRESENT: ICD-10-CM

## 2024-10-29 DIAGNOSIS — E11.40 TYPE 2 DIABETES MELLITUS WITH DIABETIC NEUROPATHY, WITHOUT LONG-TERM CURRENT USE OF INSULIN: Primary | ICD-10-CM

## 2024-10-29 DIAGNOSIS — M79.89 LEG SWELLING: ICD-10-CM

## 2024-10-29 DIAGNOSIS — F33.3 SEVERE EPISODE OF RECURRENT MAJOR DEPRESSIVE DISORDER, WITH PSYCHOTIC FEATURES: ICD-10-CM

## 2024-10-29 DIAGNOSIS — L40.50 PSORIATIC ARTHRITIS: ICD-10-CM

## 2024-10-29 DIAGNOSIS — F22 DELUSIONS: ICD-10-CM

## 2024-10-29 DIAGNOSIS — F32.89 OTHER DEPRESSION: ICD-10-CM

## 2024-10-29 DIAGNOSIS — S31.809A OPEN WOUND OF BUTTOCK WITH COMPLICATION, UNSPECIFIED LATERALITY, INITIAL ENCOUNTER: ICD-10-CM
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
## 2024-10-29 PROCEDURE — 99214 OFFICE O/P EST MOD 30 MIN: CPT | Mod: 95,,, | Performed by: STUDENT IN AN ORGANIZED HEALTH CARE EDUCATION/TRAINING PROGRAM

## 2024-10-29 PROCEDURE — 1159F MED LIST DOCD IN RCRD: CPT | Mod: CPTII,95,, | Performed by: STUDENT IN AN ORGANIZED HEALTH CARE EDUCATION/TRAINING PROGRAM

## 2024-10-29 PROCEDURE — 1160F RVW MEDS BY RX/DR IN RCRD: CPT | Mod: CPTII,95,, | Performed by: STUDENT IN AN ORGANIZED HEALTH CARE EDUCATION/TRAINING PROGRAM

## 2024-10-29 PROCEDURE — G2211 COMPLEX E/M VISIT ADD ON: HCPCS | Mod: 95,,, | Performed by: STUDENT IN AN ORGANIZED HEALTH CARE EDUCATION/TRAINING PROGRAM

## 2024-10-29 PROCEDURE — 4010F ACE/ARB THERAPY RXD/TAKEN: CPT | Mod: CPTII,95,, | Performed by: STUDENT IN AN ORGANIZED HEALTH CARE EDUCATION/TRAINING PROGRAM

## 2024-10-29 RX ORDER — SERTRALINE HYDROCHLORIDE 50 MG/1
50 TABLET, FILM COATED ORAL DAILY
Qty: 30 TABLET | Refills: 0 | Status: SHIPPED | OUTPATIENT
Start: 2024-10-29 | End: 2025-10-29

## 2024-10-29 RX ORDER — SERTRALINE HYDROCHLORIDE 100 MG/1
100 TABLET, FILM COATED ORAL NIGHTLY
Qty: 30 TABLET | Refills: 0 | Status: SHIPPED | OUTPATIENT
Start: 2024-10-29 | End: 2025-10-29

## 2024-10-29 RX ORDER — FLUTICASONE PROPIONATE 50 MCG
1 SPRAY, SUSPENSION (ML) NASAL DAILY PRN
Qty: 16 G | Refills: 0 | Status: SHIPPED | OUTPATIENT
Start: 2024-10-29

## 2024-10-29 NOTE — PROGRESS NOTES
Subjective:       Patient ID: Chloe Dolan is a 68 y.o. female.    Chief Complaint: No chief complaint on file.      The patient location is: [place]  The chief complaint leading to consultation is: Jardiance    Visit type: audiovisual    Face to Face time with patient: 20  20 minutes of total time spent on the encounter, which includes face to face time and non-face to face time preparing to see the patient (eg, review of tests), Obtaining and/or reviewing separately obtained history, Documenting clinical information in the electronic or other health record, Independently interpreting results (not separately reported) and communicating results to the patient/family/caregiver, or Care coordination (not separately reported).         Each patient to whom he or she provides medical services by telemedicine is:  (1) informed of the relationship between the physician and patient and the respective role of any other health care provider with respect to management of the patient; and (2) notified that he or she may decline to receive medical services by telemedicine and may withdraw from such care at any time.    Notes:     68 year old female with complex past medical history scheduled for virtual visit to discuss medications not covered by insurance. I recommend increase metformin currently taking one tab twice a day. We re-refer to psychiatry and rheumatology. We will add home health for cost prohibitive jardiance, fall risk, medication management, and go back to wound care for reported worsening of sacral wound. Come in for close follow up for reported leg swelling. Refill flonase. Refer to Case Management. Patient may be looking for her own apartment.    Diabetes  She has type 2 diabetes mellitus. No MedicAlert identification noted. The initial diagnosis of diabetes was made 4 years ago. Pertinent negatives for hypoglycemia include no confusion, dizziness, headaches, hunger, mood changes, nervousness/anxiousness,  pallor, seizures, sleepiness, speech difficulty, sweats or tremors. Pertinent negatives for diabetes include no blurred vision, no chest pain, no fatigue, no foot paresthesias, no foot ulcerations, no polydipsia, no polyphagia, no polyuria, no visual change, no weakness and no weight loss. Pertinent negatives for hypoglycemia complications include no blackouts, no hospitalization, no nocturnal hypoglycemia, no required assistance and no required glucagon injection. Symptoms are stable. Pertinent negatives for diabetic complications include no autonomic neuropathy, CVA, heart disease, nephropathy, peripheral neuropathy, PVD or retinopathy. Risk factors for coronary artery disease include dyslipidemia, family history, hypertension and sedentary lifestyle. Current diabetic treatment includes oral agent (dual therapy). She is compliant with treatment some of the time. Her weight is fluctuating minimally. She is following a generally healthy diet. Meal planning includes avoidance of concentrated sweets and carbohydrate counting. She has not had a previous visit with a dietitian. She rarely participates in exercise. She monitors blood glucose at home 1-2 x per day. Blood glucose monitoring compliance is good. Her home blood glucose trend is fluctuating minimally. She does not see a podiatrist.Eye exam is not current.       Past Medical History:   Diagnosis Date    Acute bacterial sinusitis 07/31/2024    Diabetes mellitus     Diverticular disease 07/31/2024    Dyspnea on exertion 10/10/2023    Edema 12/19/2023    Heart disease     History of coronary artery bypass graft 07/31/2024    Hyperlipidemia     Hypertension     Neuropathy        Past Surgical History:   Procedure Laterality Date    ARTERIAL BYPASS SURGRY      back surgey      CHOLECYSTECTOMY      TOTAL KNEE ARTHROPLASTY      WISDOM TOOTH EXTRACTION         Review of patient's allergies indicates:   Allergen Reactions    Adhesive Itching       Social History      Socioeconomic History    Marital status:    Tobacco Use    Smoking status: Former     Current packs/day: 0.00     Types: Cigarettes     Quit date: 2010     Years since quittin.8    Smokeless tobacco: Never   Substance and Sexual Activity    Alcohol use: No    Drug use: No    Sexual activity: Yes     Partners: Male     Comment:      Social Drivers of Health     Financial Resource Strain: Medium Risk (10/30/2024)    Overall Financial Resource Strain (CARDIA)     Difficulty of Paying Living Expenses: Somewhat hard   Food Insecurity: Food Insecurity Present (10/30/2024)    Hunger Vital Sign     Worried About Running Out of Food in the Last Year: Sometimes true     Ran Out of Food in the Last Year: Sometimes true   Transportation Needs: Unmet Transportation Needs (10/30/2024)    PRAPARE - Transportation     Lack of Transportation (Medical): Yes     Lack of Transportation (Non-Medical): No   Physical Activity: Inactive (10/30/2024)    Exercise Vital Sign     Days of Exercise per Week: 0 days     Minutes of Exercise per Session: 0 min   Stress: Stress Concern Present (10/30/2024)    [employer] [place] [employer] - Occupational Stress Questionnaire     Feeling of Stress : Rather much   Housing Stability: Low Risk  (10/30/2024)    Housing Stability Vital Sign     Unable to Pay for Housing in the Last Year: No     Homeless in the Last Year: No       Current Outpatient Medications on File Prior to Visit   Medication Sig Dispense Refill    albuterol (PROVENTIL/VENTOLIN HFA) 90 mcg/actuation inhaler Inhale 2 puffs into the lungs every 4 (four) hours as needed.      ARIPiprazole (ABILIFY) 5 MG Tab Take 1 tablet (5 mg total) by mouth once daily. 30 tablet 0    aspirin (ECOTRIN) 81 MG EC tablet Take 1 tablet (81 mg total) by mouth once daily.      atorvastatin (LIPITOR) 40 MG tablet Take 1 tablet (40 mg total) by mouth every evening. 90 tablet 3    blood glucose control, low (TRUE METRIX LEVEL 1)  Soln use as directed      blood sugar diagnostic Strp 1 strip 2 (two) times a day.      blood-glucose meter Misc Use daily      fluconazole (DIFLUCAN) 150 MG Tab Take one tablet by mouth one time at end of doxycycline for anticipated medication adverse effect of vaginal candidiasis. If no improvement may repeat in three days. 1 tablet 1    furosemide (LASIX) 40 MG tablet TAKE 1 TABLET BY MOUTH DAILY AS NEEDED FOR SWELLING      gabapentin (NEURONTIN) 300 MG capsule Take one capsule by mouth in the morning, one capsule by mouth midday, and two capsules in the evening. 540 capsule 1    isosorbide mononitrate (IMDUR) 30 MG 24 hr tablet Take 1 tablet by mouth every morning.      lancets Misc 2 (two) times daily.      loperamide (IMODIUM) 2 mg capsule Take 1 capsule by mouth as needed.      losartan (COZAAR) 50 MG tablet Take 50 mg by mouth once daily.      metoprolol succinate (TOPROL-XL) 50 MG 24 hr tablet 50 mg 2 (two) times daily.  3    sertraline (ZOLOFT) 100 MG tablet Take 1 tablet (100 mg total) by mouth every evening. 30 tablet 0    trospium (SANCTURA) 20 mg Tab tablet Take 1 tablet (20 mg total) by mouth 2 (two) times daily. 60 tablet 11     No current facility-administered medications on file prior to visit.       Family History   Problem Relation Name Age of Onset    Stroke Mother      Heart attack Mother      Cancer Father      Diabetes Maternal Grandmother      Heart disease Maternal Grandmother      Breast cancer Cousin             Objective:      There were no vitals taken for this visit.  Physical Exam  Constitutional:       Comments: Speaks in complete sentences. No increased work of breathing. Calm, cooperative.   Neurological:      Mental Status: Mental status is at baseline.         Assessment:       1. Type 2 diabetes mellitus with diabetic neuropathy, without long-term current use of insulin    2. Leg swelling    3. Other depression    4. Delusion    5. Fall, subsequent encounter    6. Nasal  congestion    7. Open wound of buttock with complication, unspecified laterality, initial encounter    8. Dysuria    9. Rheumatoid arthritis, involving unspecified site, unspecified whether rheumatoid factor present    10. Psoriatic arthritis        Plan:       Type 2 diabetes mellitus with diabetic neuropathy, without long-term current use of insulin  - Refer to Case Management since Jardiance is cost prohibitive. Bring medicines to follow up.    Leg swelling  -     Ambulatory referral/consult to Home Health; Future; Expected date: 10/30/2024  -     Ambulatory referral/consult to Outpatient Case Management    Other depression  -     Ambulatory referral/consult to Psychiatry; Future; Expected date: 11/05/2024  -     Ambulatory referral/consult to Home Health; Future; Expected date: 10/30/2024  -     Ambulatory referral/consult to Outpatient Case Management  -     Ambulatory referral/consult to Psychiatry; Future; Expected date: 11/05/2024    Delusion  -     Ambulatory referral/consult to Psychiatry; Future; Expected date: 11/05/2024  -     Ambulatory referral/consult to Psychiatry; Future; Expected date: 11/05/2024    Fall, subsequent encounter  -     Ambulatory referral/consult to Home Health; Future; Expected date: 10/30/2024    Nasal congestion  -     fluticasone propionate (FLONASE) 50 mcg/actuation nasal spray; Spray 1 spray (50 mcg total) by Each Nostril route daily as needed for Rhinitis.  Dispense: 16 g; Refill: 0    Open wound of buttock with complication, unspecified laterality, initial encounter  -     Ambulatory referral/consult to Wound Clinic; Future; Expected date: 11/05/2024  -     Ambulatory referral/consult to Outpatient Case Management    Dysuria  - Encouraged patient to reach out to urology with whom she follows.    Rheumatoid arthritis, involving unspecified site, unspecified whether rheumatoid factor present  -     Ambulatory referral/consult to Rheumatology; Future; Expected date:  11/05/2024    Psoriatic arthritis  -     Ambulatory referral/consult to Rheumatology; Future; Expected date: 11/05/2024        Come for in person visit in 1-2 weeks for follow up of above.    Visit today included increased complexity associated with the care of the episodic problem leg swelling addressed and managing the longitudinal care of the patient due to the serious and/or complex managed problem(s) type 2 diabetes.

## 2024-10-29 NOTE — PATIENT INSTRUCTIONS
Follow up in one week to see buttocks and leg swelling  Call Dr. Chandra about the inhaler  Call Urology about the pain with urination  Call [phone] to schedule with psychiatry and behavioral health  Refer back to wound care  Refer to Case Management cost of medications, multiple referrals, and home health  Refer to home health

## 2024-10-30 ENCOUNTER — PATIENT OUTREACH (OUTPATIENT)
Dept: ADMINISTRATIVE | Facility: OTHER | Age: 69
End: 2024-10-30
Payer: MEDICARE

## 2024-11-02 ENCOUNTER — PATIENT MESSAGE (OUTPATIENT)
Dept: FAMILY MEDICINE | Facility: CLINIC | Age: 69
End: 2024-11-02
Payer: MEDICARE

## 2024-11-04 ENCOUNTER — PATIENT OUTREACH (OUTPATIENT)
Dept: ADMINISTRATIVE | Facility: OTHER | Age: 69
End: 2024-11-04
Payer: MEDICARE

## 2024-11-04 DIAGNOSIS — E11.65 UNCONTROLLED TYPE 2 DIABETES MELLITUS WITH HYPERGLYCEMIA: ICD-10-CM

## 2024-11-04 DIAGNOSIS — E11.40 TYPE 2 DIABETES MELLITUS WITH DIABETIC NEUROPATHY, WITHOUT LONG-TERM CURRENT USE OF INSULIN: Primary | ICD-10-CM

## 2024-11-04 DIAGNOSIS — E11.65 UNCONTROLLED TYPE 2 DIABETES MELLITUS WITH HYPERGLYCEMIA: Primary | ICD-10-CM

## 2024-11-04 DIAGNOSIS — I50.32 CHRONIC HEART FAILURE WITH PRESERVED EJECTION FRACTION: ICD-10-CM

## 2024-11-04 RX ORDER — METFORMIN HYDROCHLORIDE 1000 MG/1
1000 TABLET ORAL 2 TIMES DAILY WITH MEALS
Qty: 180 TABLET | Refills: 3 | Status: SHIPPED | OUTPATIENT
Start: 2024-11-04 | End: 2025-11-04

## 2024-11-04 NOTE — PROGRESS NOTES
CHW - Follow Up    This LPN completed a follow up visit with patient via telephone today.  Pt/Caregiver reported:   Community Health Worker provided: Received msg from provide who wants pt to be on jardiance but it was removed from med list because of cost. Asked her to please re-prescribe it if she still would like, and then PAP can f/u. Advised pcp pt does have a psychiatry f/u. Waiting on rheum and endo appts. Recent referral to rheum but I did not see an endocrine referral placed, asked to place one if warranted. Let her know we are working on housing options for pt. Pt recently saw wound care. Pt states HH came Friday and will come today. Pt states roommate backed out and she needs to find a place for herself only, sometime by Dec or Jan. Will look into [facility] [facility] [place] [facility] [place]. Emailing pt info on [employer] and [employer] transit through [facility].   Follow up required:   Follow-up Outreach - Due: 11/5/2024

## 2024-11-05 ENCOUNTER — TELEPHONE (OUTPATIENT)
Dept: FAMILY MEDICINE | Facility: CLINIC | Age: 69
End: 2024-11-05
Payer: MEDICARE

## 2024-11-05 ENCOUNTER — PATIENT MESSAGE (OUTPATIENT)
Dept: PHARMACY | Facility: CLINIC | Age: 69
End: 2024-11-05
Payer: MEDICARE

## 2024-11-05 ENCOUNTER — OFFICE VISIT (OUTPATIENT)
Dept: FAMILY MEDICINE | Facility: CLINIC | Age: 69
End: 2024-11-05
Payer: MEDICARE

## 2024-11-05 ENCOUNTER — TELEPHONE (OUTPATIENT)
Dept: PHARMACY | Facility: CLINIC | Age: 69
End: 2024-11-05
Payer: MEDICARE

## 2024-11-05 ENCOUNTER — PATIENT OUTREACH (OUTPATIENT)
Dept: ADMINISTRATIVE | Facility: OTHER | Age: 69
End: 2024-11-05
Payer: MEDICARE

## 2024-11-05 ENCOUNTER — LAB VISIT (OUTPATIENT)
Dept: LAB | Facility: HOSPITAL | Age: 69
End: 2024-11-05
Attending: STUDENT IN AN ORGANIZED HEALTH CARE EDUCATION/TRAINING PROGRAM
Payer: MEDICARE

## 2024-11-05 VITALS
DIASTOLIC BLOOD PRESSURE: 60 MMHG | OXYGEN SATURATION: 98 % | WEIGHT: 166.69 LBS | HEIGHT: 65 IN | HEART RATE: 72 BPM | BODY MASS INDEX: 27.77 KG/M2 | SYSTOLIC BLOOD PRESSURE: 106 MMHG

## 2024-11-05 DIAGNOSIS — Z12.31 SCREENING MAMMOGRAM FOR BREAST CANCER: ICD-10-CM

## 2024-11-05 DIAGNOSIS — R39.15 URGENCY OF URINATION: ICD-10-CM

## 2024-11-05 DIAGNOSIS — N30.00 ACUTE CYSTITIS WITHOUT HEMATURIA: Primary | ICD-10-CM

## 2024-11-05 DIAGNOSIS — R32 URINARY INCONTINENCE, UNSPECIFIED TYPE: ICD-10-CM

## 2024-11-05 DIAGNOSIS — E11.9 TYPE 2 DIABETES MELLITUS WITHOUT COMPLICATION: ICD-10-CM

## 2024-11-05 DIAGNOSIS — Z23 IMMUNIZATION DUE: ICD-10-CM

## 2024-11-05 DIAGNOSIS — E11.40 TYPE 2 DIABETES MELLITUS WITH DIABETIC NEUROPATHY, WITHOUT LONG-TERM CURRENT USE OF INSULIN: ICD-10-CM

## 2024-11-05 DIAGNOSIS — N39.0 RECURRENT UTI: ICD-10-CM

## 2024-11-05 LAB
ALBUMIN/CREAT UR: 52.2 UG/MG (ref 0–30)
BILIRUBIN, UA POC OHS: NEGATIVE
BLOOD, UA POC OHS: ABNORMAL
CLARITY, UA POC OHS: ABNORMAL
COLOR, UA POC OHS: YELLOW
CREAT UR-MCNC: 67 MG/DL (ref 15–325)
GLUCOSE, UA POC OHS: NEGATIVE
KETONES, UA POC OHS: NEGATIVE
LEUKOCYTES, UA POC OHS: ABNORMAL
MICROALBUMIN UR DL<=1MG/L-MCNC: 35 UG/ML
NITRITE, UA POC OHS: POSITIVE
PH, UA POC OHS: 6
PROTEIN, UA POC OHS: NEGATIVE
SPECIFIC GRAVITY, UA POC OHS: 1.01
UROBILINOGEN, UA POC OHS: 0.2

## 2024-11-05 PROCEDURE — 87088 URINE BACTERIA CULTURE: CPT | Performed by: UROLOGY

## 2024-11-05 PROCEDURE — 99214 OFFICE O/P EST MOD 30 MIN: CPT | Mod: 25,S$GLB,, | Performed by: NURSE PRACTITIONER

## 2024-11-05 PROCEDURE — 1159F MED LIST DOCD IN RCRD: CPT | Mod: CPTII,S$GLB,, | Performed by: NURSE PRACTITIONER

## 2024-11-05 PROCEDURE — 87186 SC STD MICRODIL/AGAR DIL: CPT | Performed by: UROLOGY

## 2024-11-05 PROCEDURE — 3008F BODY MASS INDEX DOCD: CPT | Mod: CPTII,S$GLB,, | Performed by: NURSE PRACTITIONER

## 2024-11-05 PROCEDURE — 99999 PR PBB SHADOW E&M-EST. PATIENT-LVL V: CPT | Mod: PBBFAC,,, | Performed by: NURSE PRACTITIONER

## 2024-11-05 PROCEDURE — 4010F ACE/ARB THERAPY RXD/TAKEN: CPT | Mod: CPTII,S$GLB,, | Performed by: NURSE PRACTITIONER

## 2024-11-05 PROCEDURE — 81003 URINALYSIS AUTO W/O SCOPE: CPT | Mod: QW,S$GLB,, | Performed by: NURSE PRACTITIONER

## 2024-11-05 PROCEDURE — 3078F DIAST BP <80 MM HG: CPT | Mod: CPTII,S$GLB,, | Performed by: NURSE PRACTITIONER

## 2024-11-05 PROCEDURE — 1125F AMNT PAIN NOTED PAIN PRSNT: CPT | Mod: CPTII,S$GLB,, | Performed by: NURSE PRACTITIONER

## 2024-11-05 PROCEDURE — 90653 IIV ADJUVANT VACCINE IM: CPT | Mod: S$GLB,,, | Performed by: NURSE PRACTITIONER

## 2024-11-05 PROCEDURE — 3074F SYST BP LT 130 MM HG: CPT | Mod: CPTII,S$GLB,, | Performed by: NURSE PRACTITIONER

## 2024-11-05 PROCEDURE — 82570 ASSAY OF URINE CREATININE: CPT | Performed by: STUDENT IN AN ORGANIZED HEALTH CARE EDUCATION/TRAINING PROGRAM

## 2024-11-05 PROCEDURE — 3288F FALL RISK ASSESSMENT DOCD: CPT | Mod: CPTII,S$GLB,, | Performed by: NURSE PRACTITIONER

## 2024-11-05 PROCEDURE — 87086 URINE CULTURE/COLONY COUNT: CPT | Performed by: UROLOGY

## 2024-11-05 PROCEDURE — 1100F PTFALLS ASSESS-DOCD GE2>/YR: CPT | Mod: CPTII,S$GLB,, | Performed by: NURSE PRACTITIONER

## 2024-11-05 PROCEDURE — G0008 ADMIN INFLUENZA VIRUS VAC: HCPCS | Mod: S$GLB,,, | Performed by: NURSE PRACTITIONER

## 2024-11-05 RX ORDER — FOLIC ACID 0.8 MG
800 TABLET ORAL DAILY
COMMUNITY

## 2024-11-05 RX ORDER — IPRATROPIUM BROMIDE 21 UG/1
2 SPRAY, METERED NASAL
COMMUNITY

## 2024-11-05 RX ORDER — CHLORTHALIDONE 25 MG/1
25 TABLET ORAL DAILY
COMMUNITY

## 2024-11-05 RX ORDER — GLIPIZIDE 5 MG/1
5 TABLET ORAL
Qty: 30 TABLET | Refills: 11 | Status: SHIPPED | OUTPATIENT
Start: 2024-11-05 | End: 2025-11-05

## 2024-11-05 RX ORDER — NITROFURANTOIN 25; 75 MG/1; MG/1
100 CAPSULE ORAL 2 TIMES DAILY
Qty: 10 CAPSULE | Refills: 0 | Status: SHIPPED | OUTPATIENT
Start: 2024-11-05 | End: 2024-11-10

## 2024-11-05 RX ORDER — FERROUS SULFATE, DRIED 160(50) MG
1 TABLET, EXTENDED RELEASE ORAL 2 TIMES DAILY WITH MEALS
COMMUNITY

## 2024-11-05 RX ORDER — B-COMPLEX WITH VITAMIN C
1 TABLET ORAL DAILY
COMMUNITY

## 2024-11-05 RX ORDER — CETIRIZINE HYDROCHLORIDE 10 MG/1
10 TABLET ORAL DAILY
COMMUNITY

## 2024-11-05 NOTE — PROGRESS NOTES
"Subjective     Patient ID: Chloe Dolan is a 68 y.o. female.    Chief Complaint: Follow-up      Follow-up  Pertinent negatives include no abdominal pain, fever, nausea or vomiting.         Patient is new to me. PCP is Dr. Dominguez.    67 y/o F with DDD, anxiety, Depression, HFpEF, CAD, Afib, HTN, RA, DM2, GERD, open wound to buttock presents to clinic for followup. Saw Dr. Dominguez recently. She has multiple co-morbidities and SDOH insecurities. She is going through a divorce, currently living with her daughter, she is seeking permanent housing on the [place]. She is currently looking at apartments. She is working with a  to help with medication costs and housing. She is complaining today of urgency of urine. She did a urine sample at the lab today. Culture was ordered by urology. She is +nitrites and leuks. She denies itching/irritation. She is incontinent. She also is on Metformin for diabetes, she is unable to afford the jardiance at this time. She is going to be applying for the patient assistance program but she forgot the paperwork at home. She is checking sugars q am. Typically running 120-190 fasting. She is having LE edema bilaterally, chronic. Likely dependent edema due to sitting in a chair most of the day. She has jobst stockings at home that she reports wearing regularly. She denies any SOB/Chest pain or DIXON.     HM- due for diabetic eye exam, foot exam, and mammogram.    Review of Systems   Constitutional:  Negative for fever.   Gastrointestinal:  Negative for abdominal pain, constipation, diarrhea, nausea and vomiting.   Genitourinary:  Positive for dysuria and urgency. Negative for flank pain and hematuria.          Objective   Vitals:    11/05/24 1329   BP: 106/60   Pulse: 72   SpO2: 98%   Weight: 75.6 kg (166 lb 10.7 oz)   Height: 5' 5" (1.651 m)      Physical Exam  Constitutional:       General: She is not in acute distress.     Appearance: Normal appearance. She is not " ill-appearing, toxic-appearing or diaphoretic.   HENT:      Head: Normocephalic and atraumatic.   Cardiovascular:      Rate and Rhythm: Normal rate and regular rhythm.      Pulses:           Dorsalis pedis pulses are 2+ on the right side and 2+ on the left side.        Posterior tibial pulses are 1+ on the right side and 1+ on the left side.      Heart sounds: Normal heart sounds. No murmur heard.     No friction rub. No gallop.   Pulmonary:      Effort: No respiratory distress.      Breath sounds: Normal breath sounds. No stridor. No wheezing, rhonchi or rales.   Chest:      Chest wall: No tenderness.   Abdominal:      General: Abdomen is flat.      Palpations: Abdomen is soft.      Tenderness: There is no abdominal tenderness. There is no right CVA tenderness, left CVA tenderness or guarding.   Musculoskeletal:      Right lower leg: Edema (2+ pitting edema) present.      Left lower leg: Edema (2+ pitting edema) present.   Feet:      Right foot:      Protective Sensation: 10 sites tested.  9 sites sensed.      Skin integrity: No skin breakdown, erythema or warmth.      Toenail Condition: Right toenails are abnormally thick.      Left foot:      Protective Sensation: 10 sites tested.  9 sites sensed.      Skin integrity: No skin breakdown, erythema or warmth.      Toenail Condition: Left toenails are abnormally thick.   Skin:     General: Skin is warm and dry.      Capillary Refill: Capillary refill takes less than 2 seconds.   Neurological:      General: No focal deficit present.      Mental Status: She is alert and oriented to person, place, and time. Mental status is at baseline.   Psychiatric:         Mood and Affect: Mood normal.         Behavior: Behavior normal.         Thought Content: Thought content normal.         Judgment: Judgment normal.         1. Acute cystitis without hematuria  - nitrofurantoin, macrocrystal-monohydrate, (MACROBID) 100 MG capsule; Take 1 capsule (100 mg total) by mouth 2 (two)  times daily. for 5 days  Dispense: 10 capsule; Refill: 0  Increase water intake.     2. Screening mammogram for breast cancer  - Mammo Digital Screening Bilat w/ Andrew; Future    3. Type 2 diabetes mellitus with diabetic neuropathy, without long-term current use of insulin  - Ambulatory referral/consult to Ophthalmology; Future  - glipiZIDE (GLUCOTROL) 5 MG tablet; Take 1 tablet (5 mg total) by mouth daily with breakfast.  Dispense: 30 tablet; Refill: 11    4. Immunization due  - influenza (adjuvanted) (Fluad) 45 mcg/0.5 mL IM vaccine (> or = 66 yo) 0.5 mL    5. Urgency of urination  - POCT Urinalysis(Instrument)  - POCT URINE DIPSTICK WITHOUT MICROSCOPE       RTC/ER precautions given. F/U 2 weeks with blood sugar logs.    Counseled on regular exercise, maintenance of a healthy weight, balanced diet rich in fruits/vegetables and lean protein, and avoidance of unhealthy habits like smoking and excessive alcohol intake.    Makenna Hernandez, TRISTEN-C

## 2024-11-05 NOTE — PROGRESS NOTES
CHW - Follow Up    This LPN completed a follow up visit with patient via telephone today.  Pt/Caregiver reported:   Community Health Worker provided: Providing pt with a list of state housing resources, low income housing for jammie ornelas, [place]a [place] in [facility], and [employer] options through email.   Follow up required:   Follow-up Outreach - Due: 11/19/2024

## 2024-11-05 NOTE — TELEPHONE ENCOUNTER
Chloe Dolan has been informed of the [facility] application process for Jardiance and what's required to apply.  She will provide the following documents: Proof of household Income( such as social security statement, 1099 form, pension statement or 3 consecutive pay stubs, Copy of all Insurance cards( front and back), and Completed [facility] Authorization Forms        Follow-up will be made in 5 business days.     Shukri Steinberg  Pharmacy Patient Assistance Team

## 2024-11-05 NOTE — TELEPHONE ENCOUNTER
Called pt and informed her that we will need her to bring all her current meds to the clinic so we can update her chart per Dr. Dominguez. She stated that she was irritated because she just got seen by her PCP recently and they have it all on file but she stated she will bring it anyway.

## 2024-11-07 ENCOUNTER — PATIENT OUTREACH (OUTPATIENT)
Dept: ADMINISTRATIVE | Facility: OTHER | Age: 69
End: 2024-11-07
Payer: MEDICARE

## 2024-11-07 ENCOUNTER — OFFICE VISIT (OUTPATIENT)
Dept: CARDIOLOGY | Facility: CLINIC | Age: 69
End: 2024-11-07
Attending: INTERNAL MEDICINE
Payer: MEDICARE

## 2024-11-07 VITALS
HEART RATE: 66 BPM | SYSTOLIC BLOOD PRESSURE: 109 MMHG | HEIGHT: 65 IN | WEIGHT: 165.38 LBS | DIASTOLIC BLOOD PRESSURE: 63 MMHG | BODY MASS INDEX: 27.56 KG/M2

## 2024-11-07 DIAGNOSIS — I25.10 ARTERIOSCLEROSIS OF CORONARY ARTERY: ICD-10-CM

## 2024-11-07 DIAGNOSIS — I48.0 PAROXYSMAL ATRIAL FIBRILLATION: Primary | ICD-10-CM

## 2024-11-07 DIAGNOSIS — I10 PRIMARY HYPERTENSION: ICD-10-CM

## 2024-11-07 DIAGNOSIS — I25.10 CAD S/P PERCUTANEOUS CORONARY ANGIOPLASTY: ICD-10-CM

## 2024-11-07 DIAGNOSIS — I25.9 CHRONIC ISCHEMIC HEART DISEASE: ICD-10-CM

## 2024-11-07 DIAGNOSIS — I25.810 CORONARY ATHEROSCLEROSIS OF AUTOLOGOUS BYPASS GRAFT: ICD-10-CM

## 2024-11-07 DIAGNOSIS — Z98.61 CAD S/P PERCUTANEOUS CORONARY ANGIOPLASTY: ICD-10-CM

## 2024-11-07 DIAGNOSIS — I50.32 CHRONIC HEART FAILURE WITH PRESERVED EJECTION FRACTION: ICD-10-CM

## 2024-11-07 LAB
OHS QRS DURATION: 94 MS
OHS QTC CALCULATION: 443 MS

## 2024-11-07 PROCEDURE — 93005 ELECTROCARDIOGRAM TRACING: CPT | Mod: PO

## 2024-11-07 PROCEDURE — 93010 ELECTROCARDIOGRAM REPORT: CPT | Mod: S$GLB,,, | Performed by: INTERNAL MEDICINE

## 2024-11-07 PROCEDURE — 99999 PR PBB SHADOW E&M-EST. PATIENT-LVL III: CPT | Mod: PBBFAC,,, | Performed by: INTERNAL MEDICINE

## 2024-11-07 NOTE — PROGRESS NOTES
SUBJECTIVE:    Patient ID:  Chloe Dolan is a 68 y.o. female who presents for follow of AF    Medical history:  Paroxysmal atrial fibrillation?  HFpEF  CAD s/p CABG  HTN  HLD  DM type 2 (A1c 8)  Delusions     Previously followed by Dr. Fercho Oconnell ([facility]). Outside records reviewed.  She has a documented history of AF per outside cardiology notes. Not on anticoagulation?  Recent ED visit for fall with facial trauma. Hyperglycemic >400. Not in DKA. No Syncope  All ECGs in system reviewed     Holter 9/23: overall remarkable for significant tachy or bradyarrhythmias  [facility] 2019: patent LIMA-D1 and into the mid LAD, patent SVG-LCx, occluded SVG  TTE 8/23: LVEF 65%  SPECT 2022: negative     Relevant labs: cr 0.8, TSH 3  Relevant Rx: Toprol 50 mg QD, ASA 81 mg QD, Lasix 40 mg QD, losartan 50 mg BID, lipitor 40 mg QD     9/23/24  Denies chest pain or SOB  Taking HCTZ for swelling  Taking lasix 40 mg every day  Reports having AF after surgery 2022    In clinic today:  No new complaints since last visit.  Still have not received records from [facility] which we sent over another request  Fourteen day event monitor showed no sustained tachy- or bradyarrhythmias. Very rare atrial and ventricular ectopy. AEM with 19 patient triggered events corresponding to NSR or very mild sinus tachycardia in low 100s.   She has movement in [place] and would like to establish with a cardiologist there    I personally reviewed the ECG/telemetry strip today. My interpretation is sinus rhythm at 64 beats per minute with normal intervals    Past Medical History:   Diagnosis Date    Acute bacterial sinusitis 07/31/2024    Diabetes mellitus     Diverticular disease 07/31/2024    Dyspnea on exertion 10/10/2023    Edema 12/19/2023    Heart disease     History of coronary artery bypass graft 07/31/2024    Hyperlipidemia     Hypertension     Neuropathy        Past Surgical History:   Procedure Laterality Date    ARTERIAL BYPASS  SURGRY      back surgey      CHOLECYSTECTOMY      TOTAL KNEE ARTHROPLASTY      WISDOM TOOTH EXTRACTION         Family History   Problem Relation Name Age of Onset    Stroke Mother      Heart attack Mother      Cancer Father      Diabetes Maternal Grandmother      Heart disease Maternal Grandmother      Breast cancer Cousin         Social History     Socioeconomic History    Marital status:    Tobacco Use    Smoking status: Former     Current packs/day: 0.00     Types: Cigarettes     Quit date:      Years since quittin.8    Smokeless tobacco: Never   Substance and Sexual Activity    Alcohol use: No    Drug use: No    Sexual activity: Yes     Partners: Male     Comment:      Social Drivers of Health     Financial Resource Strain: Medium Risk (10/30/2024)    Overall Financial Resource Strain (CARDIA)     Difficulty of Paying Living Expenses: Somewhat hard   Food Insecurity: Food Insecurity Present (10/30/2024)    Hunger Vital Sign     Worried About Running Out of Food in the Last Year: Sometimes true     Ran Out of Food in the Last Year: Sometimes true   Transportation Needs: Unmet Transportation Needs (10/30/2024)    PRAPARE - Transportation     Lack of Transportation (Medical): Yes     Lack of Transportation (Non-Medical): No   Physical Activity: Inactive (10/30/2024)    Exercise Vital Sign     Days of Exercise per Week: 0 days     Minutes of Exercise per Session: 0 min   Stress: Stress Concern Present (10/30/2024)    [place] [employer] - Occupational Stress Questionnaire     Feeling of Stress : Rather much   Housing Stability: Low Risk  (10/30/2024)    Housing Stability Vital Sign     Unable to Pay for Housing in the Last Year: No     Homeless in the Last Year: No       Review of patient's allergies indicates:   Allergen Reactions    Adhesive Itching       Review of Systems   Constitutional: Negative for chills and fever.   HENT:  Negative for congestion and hearing  loss.    Eyes:  Negative for blurred vision and double vision.   Cardiovascular:         See HPI   Respiratory:  Negative for cough, hemoptysis and shortness of breath.    Endocrine: Negative for cold intolerance and heat intolerance.   Musculoskeletal:  Negative for joint pain and joint swelling.   Gastrointestinal:  Negative for abdominal pain, nausea and vomiting.   Genitourinary:  Negative for dysuria and hematuria.   Neurological:  Negative for focal weakness and headaches.   Psychiatric/Behavioral:  Negative for altered mental status.    All other systems reviewed and are negative.         OBJECTIVE:     Vitals:    11/07/24 0945   BP: 109/63   Pulse: 66         BP Readings from Last 5 Encounters:   11/05/24 106/60   10/31/24 (!) 111/55   10/01/24 138/81   09/23/24 (!) 91/57   09/10/24 108/68        Physical Exam  Vitals and nursing note reviewed.   Constitutional:       General: She is not in acute distress.     Appearance: She is well-developed. She is not diaphoretic.   HENT:      Head: Normocephalic and atraumatic.   Eyes:      General: No scleral icterus.        Right eye: No discharge.         Left eye: No discharge.   Cardiovascular:      Rate and Rhythm: Normal rate and regular rhythm. No extrasystoles are present.     Pulses: Normal pulses and intact distal pulses.           Radial pulses are 2+ on the right side and 2+ on the left side.      Heart sounds: Normal heart sounds, S1 normal and S2 normal. Heart sounds not distant. No opening snap. No murmur heard.     No friction rub. No gallop. No S3 or S4 sounds.   Pulmonary:      Effort: Pulmonary effort is normal. No respiratory distress.      Breath sounds: No wheezing or rales.   Abdominal:      General: Bowel sounds are normal. There is no distension.      Palpations: Abdomen is soft.      Tenderness: There is no abdominal tenderness.   Musculoskeletal:         General: No deformity. Normal range of motion.      Cervical back: Normal range of motion  and neck supple.   Skin:     General: Skin is warm and dry.      Findings: No erythema or rash.   Neurological:      Mental Status: She is alert.             Current Outpatient Medications   Medication Instructions    albuterol (PROVENTIL/VENTOLIN HFA) 90 mcg/actuation inhaler 2 puffs, Every 4 hours PRN    ARIPiprazole (ABILIFY) 5 mg, Oral, Daily    aspirin (ECOTRIN) 81 mg, Oral, Daily    atorvastatin (LIPITOR) 40 mg, Oral, Nightly    B-complex with vitamin C (Z-BEC OR EQUIV) tablet 1 tablet, Daily    blood glucose control, low (TRUE METRIX LEVEL 1) Soln use as directed    blood sugar diagnostic Strp 1 strip, 2 times daily    blood-glucose meter Misc Use daily    calcium-vitamin D3 (OS-JESUS 500 + D3) 500 mg-5 mcg (200 unit) per tablet 1 tablet, 2 times daily with meals    cetirizine (ZYRTEC) 10 mg, Daily    chlorthalidone (HYGROTEN) 25 mg, Daily    empagliflozin (JARDIANCE) 25 mg, Oral, Daily    fluconazole (DIFLUCAN) 150 MG Tab Take one tablet by mouth one time at end of doxycycline for anticipated medication adverse effect of vaginal candidiasis. If no improvement may repeat in three days.    fluticasone propionate (FLONASE) 50 mcg/actuation nasal spray Spray 1 spray (50 mcg total) by Each Nostril route daily as needed for Rhinitis.    folic acid (FOLVITE) 800 mcg, Daily    furosemide (LASIX) 40 MG tablet TAKE 1 TABLET BY MOUTH DAILY AS NEEDED FOR SWELLING    gabapentin (NEURONTIN) 300 MG capsule Take one capsule by mouth in the morning, one capsule by mouth midday, and two capsules in the evening.    glipiZIDE (GLUCOTROL) 5 mg, Oral, With breakfast    ipratropium (ATROVENT) 21 mcg (0.03 %) nasal spray 2 sprays, Every 12 hours (non-standard times)    isosorbide mononitrate (IMDUR) 30 MG 24 hr tablet 1 tablet, Every morning    lancets Misc 2 times daily    loperamide (IMODIUM) 2 mg capsule 1 capsule, As needed (PRN)    losartan (COZAAR) 50 mg, Daily    metFORMIN (GLUCOPHAGE) 1,000 mg, Oral, 2 times daily with meals  "   metoprolol succinate (TOPROL-XL) 50 mg, 2 times daily    nitrofurantoin, macrocrystal-monohydrate, (MACROBID) 100 MG capsule 100 mg, Oral, 2 times daily    sertraline (ZOLOFT) 100 mg, Oral, Nightly    sertraline (ZOLOFT) 50 mg, Oral, Daily    trospium (SANCTURA) 20 mg, Oral, 2 times daily       Lipid Panel:   No results found for: "CHOL", "HDL", "LDLCALC", "TRIG", "CHOLHDL"    The ASCVD Risk score (Melissa MCDERMOTT, et al., 2019) failed to calculate for the following reasons:    Cannot find a previous HDL lab    Cannot find a previous total cholesterol lab    Most Recent EKG Results  Results for orders placed or performed in visit on 09/23/24   IN OFFICE EKG 12-LEAD (to Snow)    Collection Time: 09/23/24  8:40 AM   Result Value Ref Range    QRS Duration 82 ms    [facility] QTC Calculation 418 ms    Narrative    Test Reason : I48.0,    Vent. Rate : 065 BPM     Atrial Rate : 065 BPM     P-R Int : 128 ms          QRS Dur : 082 ms      QT Int : 402 ms       P-R-T Axes : 064 -10 -18 degrees     QTc Int : 418 ms    Normal sinus rhythm  Minimal voltage criteria for LVH, may be normal variant ( R in aVL )  Inferior infarct ,age undetermined  ST-T abnormality of the inferolateral leads  Abnormal ECG  When compared with ECG of 27-AUG-2024 19:43,  Inferior infarct is now Present  T wave inversion more evident in Inferior leads  T wave inversion now evident in Lateral leads  Confirmed by Tito Chen MD ([id number]) on 9/23/2024 9:40:53 AM    Referred By: VINNY GAONA           Confirmed By:Tiot Chen MD       Most Recent Echocardiogram Results  No results found for this or any previous visit.      Most Recent Nuclear Stress Test Results  No results found for this or any previous visit.      Most Recent Cardiac PET Stress Test Results  No results found for this or any previous visit.      Most Recent Cardiovascular Angiogram results  No results found for this or any previous visit.      Other Most Recent Cardiology Results  Results for " orders placed during the hospital encounter of 09/26/24    Cardiac Monitor - 3-15 Day Adult (Cupid Only)    Interpretation Summary  otal Analysis Time: 13 days and 23 hours        Heart rates varied between 53 and 112 BPM with an average of 77 BPM.    Patient monitored for 13d 23h, analyzable time was 13d 18h starting on 09/26/2024 07:10 am.  Primary rhythm was Sinus Rhythm. Average heart rate was 77 bpm, Minimum heart rate was 53 bpm on Day 14 / 08:06:23 am, Max heart rate was 112 bpm on Day 6 / 05:20:33 pm  SVE(s): [place] was 0.01 %, 180 total SVE(s)  SV Arrhythmia(s): 2 event(s), longest event 3 beats on Day 13 / 10:20:39 pm, fastest event 110 bpm on Day 1 / 12:33:23  pm  PVC(s): [place] was 0.03 %, 481 total PVC(s), 3 disparate morphologies  Patient recorded 19 event(s) during the monitoring period    Rare, benign atrial and ventricular ectopy.  No sustained/nonsustained SVT or VT.  No SA AV sarah block.        All pertinent data including labs, imaging, EKGs, and studies listed above were reviewed.  All of the patients ECG tracings since most recent visit were personally interpreted by this provider    ASSESSMENT:   Chloe Dolan is a 68 y.o. female with a PMHx significant for HTN, HLD, CAD s/p CABG, HFpEF, and DM  who presents for follow of possible atrial fibrillation. Patient diagnosed around 2019 per chart. Ambulatory monitors without atrial fibrillation. She is not on CVA ppx for AF.  It is unclear if she actually has atrial fibrillation and will hold oral anticoagulation at this time.    1. Paroxysmal atrial fibrillation        2. Coronary atherosclerosis of autologous bypass graft        3. Chronic heart failure with preserved ejection fraction        4. CAD S/P percutaneous coronary angioplasty        5. Arteriosclerosis of coronary artery        6. Primary hypertension        7. Chronic ischemic heart disease          PLAN FOR TREATMENT OF ABOVE DIAGNOSES:     Requested medical records again from [facility]  surrounding AF diagnosis  Patient to establish care at [facility]  Continue Toprol 50 mg daily  Continue ASA 81 mg daily  Continue Lipitor 40 mg daily  Continue losartan 50 mg BID  Low sodium diet  Exercise    F/u p.r.n.    Israel Chen MD  Cardiac Electrophysiology

## 2024-11-07 NOTE — PROGRESS NOTES
CHW - Follow Up    This LPN completed a follow up visit with patient via telephone today.  Pt/Caregiver reported:   Community Health Worker provided: Pt states to hold off on looking for an apartment for her, that she thinks she found one. She did try [facility] but they wanted the income to be at least 3x the rent. Asked to call back if needs.   Follow up required:   Follow-up Outreach - Due: 11/12/2024

## 2024-11-08 ENCOUNTER — PATIENT MESSAGE (OUTPATIENT)
Dept: UROLOGY | Facility: CLINIC | Age: 69
End: 2024-11-08
Payer: MEDICARE

## 2024-11-08 LAB — BACTERIA UR CULT: ABNORMAL

## 2024-11-12 ENCOUNTER — PATIENT OUTREACH (OUTPATIENT)
Dept: ADMINISTRATIVE | Facility: OTHER | Age: 69
End: 2024-11-12
Payer: MEDICARE

## 2024-11-12 NOTE — PROGRESS NOTES
CHW - Follow Up    This LPN completed a follow up visit with patient via telephone today.  Pt/Caregiver reported:  Community Health Worker provided: Pt states she will be moving this weekend, found a place in [facility]. She will be a couple blocks from a close friend. She states her divorce will be finalized next month hopefully and she can apply for SNAP at that time. Will f/u monthly.   Follow up required:   Follow-up Outreach - Due: 12/12/2024

## 2024-11-13 ENCOUNTER — PATIENT MESSAGE (OUTPATIENT)
Dept: FAMILY MEDICINE | Facility: CLINIC | Age: 69
End: 2024-11-13
Payer: MEDICARE

## 2024-11-15 ENCOUNTER — OFFICE VISIT (OUTPATIENT)
Dept: FAMILY MEDICINE | Facility: CLINIC | Age: 69
End: 2024-11-15
Payer: MEDICARE

## 2024-11-15 DIAGNOSIS — E11.40 TYPE 2 DIABETES MELLITUS WITH DIABETIC NEUROPATHY, WITHOUT LONG-TERM CURRENT USE OF INSULIN: ICD-10-CM

## 2024-11-15 DIAGNOSIS — N30.00 ACUTE CYSTITIS WITHOUT HEMATURIA: Primary | ICD-10-CM

## 2024-11-15 RX ORDER — CEPHALEXIN 500 MG/1
500 CAPSULE ORAL EVERY 12 HOURS
Qty: 14 CAPSULE | Refills: 0 | Status: SHIPPED | OUTPATIENT
Start: 2024-11-15

## 2024-11-15 RX ORDER — GLIPIZIDE 5 MG/1
7.5 TABLET ORAL
Qty: 45 TABLET | Refills: 11 | Status: SHIPPED | OUTPATIENT
Start: 2024-11-15 | End: 2025-11-15

## 2024-11-15 NOTE — PROGRESS NOTES
Subjective:       Patient ID: Chloe Dolan is a 68 y.o. female.    Chief Complaint: No chief complaint on file.    The patient location is: [place]  The chief complaint leading to consultation is: UTI    Visit type: audiovisual    Face to Face time with patient: 5  15 minutes of total time spent on the encounter, which includes face to face time and non-face to face time preparing to see the patient (eg, review of tests), Obtaining and/or reviewing separately obtained history, Documenting clinical information in the electronic or other health record, Independently interpreting results (not separately reported) and communicating results to the patient/family/caregiver, or Care coordination (not separately reported).         Each patient to whom he or she provides medical services by telemedicine is:  (1) informed of the relationship between the physician and patient and the respective role of any other health care provider with respect to management of the patient; and (2) notified that he or she may decline to receive medical services by telemedicine and may withdraw from such care at any time.    Notes:   68 year old female presents for telehealth visit. We begin with audio and video and then lose audio. I call the patient to complete the encounter. She is moving to the [place] this weekend and plans to reschedule her DEXA and Urology appointments. She will keep optometry for diabetic eye exam which looks to be scheduled on [facility]. She finished her macrobid two days ago but still has some urinary frequency and pain with urination; her urine is also still a bit hazy. On review of the record we will choose Keflex which she tolerated in August and this culture was pansensitive. When asked her blood sugar has ranged from a low of 145 and a high of 222 upon waking. She continues metformin 1000 mg twice daily and glipizide 5 mg daily. She is still waiting on finding out about financial assistance for  Jardiance. We will increase to glipizide 7.5 mg daily and return for virtual visit in two weeks. She plans to establish with a new primary care on the [place] - she may return to see me or by virtual visit until that time. She is encouraged to reach out with questions but if concerns are urgent, call anytime day or night.        Past Medical History:   Diagnosis Date    Acute bacterial sinusitis 2024    Diabetes mellitus     Diverticular disease 2024    Dyspnea on exertion 10/10/2023    Edema 2023    Heart disease     History of coronary artery bypass graft 2024    Hyperlipidemia     Hypertension     Neuropathy        Past Surgical History:   Procedure Laterality Date    ARTERIAL BYPASS SURGRY      back surgey      CHOLECYSTECTOMY      TOTAL KNEE ARTHROPLASTY      WISDOM TOOTH EXTRACTION         Review of patient's allergies indicates:   Allergen Reactions    Adhesive Itching       Social History     Socioeconomic History    Marital status:    Tobacco Use    Smoking status: Former     Current packs/day: 0.00     Types: Cigarettes     Quit date:      Years since quittin.8    Smokeless tobacco: Never   Substance and Sexual Activity    Alcohol use: No    Drug use: No    Sexual activity: Yes     Partners: Male     Comment:      Social Drivers of Health     Financial Resource Strain: Medium Risk (10/30/2024)    Overall Financial Resource Strain (CARDIA)     Difficulty of Paying Living Expenses: Somewhat hard   Food Insecurity: Food Insecurity Present (10/30/2024)    Hunger Vital Sign     Worried About Running Out of Food in the Last Year: Sometimes true     Ran Out of Food in the Last Year: Sometimes true   Transportation Needs: Unmet Transportation Needs (10/30/2024)    PRAPARE - Transportation     Lack of Transportation (Medical): Yes     Lack of Transportation (Non-Medical): No   Physical Activity: Inactive (10/30/2024)    Exercise Vital Sign     Days of Exercise per  Week: 0 days     Minutes of Exercise per Session: 0 min   Stress: Stress Concern Present (10/30/2024)    [place] [employer] - Occupational Stress Questionnaire     Feeling of Stress : Rather much   Housing Stability: Low Risk  (10/30/2024)    Housing Stability Vital Sign     Unable to Pay for Housing in the Last Year: No     Homeless in the Last Year: No       Current Outpatient Medications on File Prior to Visit   Medication Sig Dispense Refill    albuterol (PROVENTIL/VENTOLIN HFA) 90 mcg/actuation inhaler Inhale 2 puffs into the lungs every 4 (four) hours as needed.      ARIPiprazole (ABILIFY) 5 MG Tab Take 1 tablet (5 mg total) by mouth once daily. 30 tablet 0    aspirin (ECOTRIN) 81 MG EC tablet Take 1 tablet (81 mg total) by mouth once daily.      atorvastatin (LIPITOR) 40 MG tablet Take 1 tablet (40 mg total) by mouth every evening. 90 tablet 3    B-complex with vitamin C (Z-BEC OR EQUIV) tablet Take 1 tablet by mouth once daily. (Patient not taking: Reported on 11/7/2024)      blood glucose control, low (TRUE METRIX LEVEL 1) Soln use as directed      blood sugar diagnostic Strp 1 strip 2 (two) times a day.      blood-glucose meter Misc Use daily      calcium-vitamin D3 (OS-JESUS 500 + D3) 500 mg-5 mcg (200 unit) per tablet Take 1 tablet by mouth 2 (two) times daily with meals.      cetirizine (ZYRTEC) 10 MG tablet Take 10 mg by mouth once daily.      chlorthalidone (HYGROTEN) 25 MG Tab Take 25 mg by mouth once daily.      empagliflozin (JARDIANCE) 25 mg tablet Take 1 tablet (25 mg total) by mouth once daily. (Patient not taking: Reported on 11/7/2024) 90 tablet 3    fluconazole (DIFLUCAN) 150 MG Tab Take one tablet by mouth one time at end of doxycycline for anticipated medication adverse effect of vaginal candidiasis. If no improvement may repeat in three days. 1 tablet 1    fluticasone propionate (FLONASE) 50 mcg/actuation nasal spray Spray 1 spray (50 mcg total) by Each Nostril route  daily as needed for Rhinitis. 16 g 0    folic acid (FOLVITE) 800 MCG Tab Take 800 mcg by mouth once daily.      furosemide (LASIX) 40 MG tablet TAKE 1 TABLET BY MOUTH DAILY AS NEEDED FOR SWELLING      gabapentin (NEURONTIN) 300 MG capsule Take one capsule by mouth in the morning, one capsule by mouth midday, and two capsules in the evening. 540 capsule 1    ipratropium (ATROVENT) 21 mcg (0.03 %) nasal spray 2 sprays by Each Nostril route every 12 (twelve) hours.      isosorbide mononitrate (IMDUR) 30 MG 24 hr tablet Take 1 tablet by mouth every morning.      lancets Misc 2 (two) times daily.      loperamide (IMODIUM) 2 mg capsule Take 1 capsule by mouth as needed.      losartan (COZAAR) 50 MG tablet Take 50 mg by mouth once daily.      metFORMIN (GLUCOPHAGE) 1000 MG tablet Take 1 tablet (1,000 mg total) by mouth 2 (two) times daily with meals. 180 tablet 3    metoprolol succinate (TOPROL-XL) 50 MG 24 hr tablet 50 mg 2 (two) times daily.  3    sertraline (ZOLOFT) 100 MG tablet Take 1 tablet (100 mg total) by mouth every evening. 30 tablet 0    sertraline (ZOLOFT) 50 MG tablet Take 1 tablet (50 mg total) by mouth once daily. 30 tablet 0    trospium (SANCTURA) 20 mg Tab tablet Take 1 tablet (20 mg total) by mouth 2 (two) times daily. 60 tablet 11    [DISCONTINUED] glipiZIDE (GLUCOTROL) 5 MG tablet Take 1 tablet (5 mg total) by mouth daily with breakfast. 30 tablet 11     No current facility-administered medications on file prior to visit.       Family History   Problem Relation Name Age of Onset    Stroke Mother      Heart attack Mother      Cancer Father      Diabetes Maternal Grandmother      Heart disease Maternal Grandmother      Breast cancer Cousin         Review of Systems   Constitutional:  Negative for activity change and unexpected weight change.   HENT:  Negative for hearing loss, rhinorrhea and trouble swallowing.    Eyes:  Negative for discharge and visual disturbance.   Respiratory:  Negative for chest  tightness and wheezing.    Cardiovascular:  Negative for chest pain and palpitations.   Gastrointestinal:  Negative for blood in stool, constipation, diarrhea and vomiting.   Endocrine: Positive for polyuria. Negative for polydipsia.   Genitourinary:  Positive for dysuria. Negative for difficulty urinating, hematuria and menstrual problem.   Musculoskeletal:  Negative for arthralgias, joint swelling and neck pain.   Neurological:  Negative for weakness and headaches.   Psychiatric/Behavioral:  Negative for confusion and dysphoric mood.        Objective:      There were no vitals taken for this visit.  Physical Exam    Assessment:       1. Acute cystitis without hematuria    2. Type 2 diabetes mellitus with diabetic neuropathy, without long-term current use of insulin        Plan:       Acute cystitis without hematuria  -     cephALEXin (KEFLEX) 500 MG capsule; Take 1 capsule (500 mg total) by mouth every 12 (twelve) hours.  Dispense: 14 capsule; Refill: 0    Type 2 diabetes mellitus with diabetic neuropathy, without long-term current use of insulin  -     glipiZIDE (GLUCOTROL) 5 MG tablet; Take 1.5 tablets (7.5 mg total) by mouth daily with breakfast.  Dispense: 45 tablet; Refill: 11        Return for virtual visit in two weeks for follow up of above.    Visit today included increased complexity associated with the care of the episodic problem acute cystitis addressed and managing the longitudinal care of the patient due to the serious and/or complex managed problem(s) type 2 diabetes.

## 2024-11-20 ENCOUNTER — TELEPHONE (OUTPATIENT)
Dept: FAMILY MEDICINE | Facility: CLINIC | Age: 69
End: 2024-11-20
Payer: MEDICARE

## 2024-11-20 NOTE — TELEPHONE ENCOUNTER
Noble from [facility] is wanting to place in orders for PT. If you do not agree I will call and let them know.

## 2024-11-20 NOTE — TELEPHONE ENCOUNTER
----- Message from Suzan sent at 11/20/2024  4:19 PM CST -----   Type:  Needs Medical Advice    Who Called: Noble/ [facility]      Best Call Back Number: [phone]      Additional Information: Mynor states he is going to order physical therpy for pt if dr dont want it call back in let him know.  Please call back to advise. Thank you!

## 2024-12-03 ENCOUNTER — TELEPHONE (OUTPATIENT)
Dept: FAMILY MEDICINE | Facility: CLINIC | Age: 69
End: 2024-12-03
Payer: MEDICARE

## 2024-12-03 DIAGNOSIS — F22 DELUSIONS: ICD-10-CM

## 2024-12-03 DIAGNOSIS — I10 PRIMARY HYPERTENSION: Primary | ICD-10-CM

## 2024-12-03 DIAGNOSIS — M79.89 LEG SWELLING: ICD-10-CM

## 2024-12-03 RX ORDER — SERTRALINE HYDROCHLORIDE 100 MG/1
100 TABLET, FILM COATED ORAL NIGHTLY
Qty: 30 TABLET | Refills: 1 | Status: SHIPPED | OUTPATIENT
Start: 2024-12-03 | End: 2025-12-03

## 2024-12-03 RX ORDER — FUROSEMIDE 40 MG/1
40 TABLET ORAL DAILY PRN
Qty: 30 TABLET | Refills: 0 | Status: SHIPPED | OUTPATIENT
Start: 2024-12-03

## 2024-12-03 RX ORDER — ARIPIPRAZOLE 5 MG/1
5 TABLET ORAL DAILY
Qty: 30 TABLET | Refills: 1 | Status: SHIPPED | OUTPATIENT
Start: 2024-12-03

## 2024-12-03 RX ORDER — CHLORTHALIDONE 25 MG/1
25 TABLET ORAL DAILY
Qty: 30 TABLET | Refills: 1 | Status: SHIPPED | OUTPATIENT
Start: 2024-12-03

## 2024-12-03 NOTE — TELEPHONE ENCOUNTER
Returned call & advised that they have the correct fax # however we did not receive paperwork  They stated they will send now and if still not received to notify them by end of the day

## 2024-12-03 NOTE — TELEPHONE ENCOUNTER
----- Message from Mellisa sent at 12/3/2024 12:27 PM CST -----  Regarding: refill  Contact: patient  Type:  RX Refill Request    Who Called:  patient  Refill or New Rx:  refill  RX Name and Strength:  sertraline (ZOLOFT) 100 MG tablet  chlorthalidone (HYGROTEN) 25 MG Tab  furosemide (LASIX) 40 MG tablet  How is the patient currently taking it? (ex. 1XDay):  as directed  Is this a 30 day or 90 day RX:  90  Preferred Pharmacy with phone number:      [facility]  [address] and [address]  [phone]    Local or Mail Order:  local  Ordering Provider:  Dr. Dominguez  Best Call Back Number:  [phone]    Additional Information:  Please call patient to advise.  Thanks!

## 2024-12-03 NOTE — TELEPHONE ENCOUNTER
----- Message from Best sent at 12/3/2024 10:00 AM CST -----  Contact: [facility]  Type: Needs Medical Advice  Who Called:  [facility]    Best Call Back Number: [phone]  Additional Information: Calling to see if lab requisition was received.

## 2024-12-05 ENCOUNTER — TELEPHONE (OUTPATIENT)
Dept: FAMILY MEDICINE | Facility: CLINIC | Age: 69
End: 2024-12-05
Payer: MEDICARE

## 2024-12-05 ENCOUNTER — OFFICE VISIT (OUTPATIENT)
Dept: FAMILY MEDICINE | Facility: CLINIC | Age: 69
End: 2024-12-05
Payer: MEDICARE

## 2024-12-05 DIAGNOSIS — N30.00 ACUTE CYSTITIS WITHOUT HEMATURIA: Primary | ICD-10-CM

## 2024-12-05 DIAGNOSIS — E11.40 TYPE 2 DIABETES MELLITUS WITH DIABETIC NEUROPATHY, WITHOUT LONG-TERM CURRENT USE OF INSULIN: ICD-10-CM

## 2024-12-05 PROCEDURE — 3066F NEPHROPATHY DOC TX: CPT | Mod: CPTII,95,, | Performed by: STUDENT IN AN ORGANIZED HEALTH CARE EDUCATION/TRAINING PROGRAM

## 2024-12-05 PROCEDURE — G2211 COMPLEX E/M VISIT ADD ON: HCPCS | Mod: 95,,, | Performed by: STUDENT IN AN ORGANIZED HEALTH CARE EDUCATION/TRAINING PROGRAM

## 2024-12-05 PROCEDURE — 3060F POS MICROALBUMINURIA REV: CPT | Mod: CPTII,95,, | Performed by: STUDENT IN AN ORGANIZED HEALTH CARE EDUCATION/TRAINING PROGRAM

## 2024-12-05 PROCEDURE — 99214 OFFICE O/P EST MOD 30 MIN: CPT | Mod: 95,,, | Performed by: STUDENT IN AN ORGANIZED HEALTH CARE EDUCATION/TRAINING PROGRAM

## 2024-12-05 PROCEDURE — 3051F HG A1C>EQUAL 7.0%<8.0%: CPT | Mod: CPTII,95,, | Performed by: STUDENT IN AN ORGANIZED HEALTH CARE EDUCATION/TRAINING PROGRAM

## 2024-12-05 PROCEDURE — 4010F ACE/ARB THERAPY RXD/TAKEN: CPT | Mod: CPTII,95,, | Performed by: STUDENT IN AN ORGANIZED HEALTH CARE EDUCATION/TRAINING PROGRAM

## 2024-12-05 PROCEDURE — 1159F MED LIST DOCD IN RCRD: CPT | Mod: CPTII,95,, | Performed by: STUDENT IN AN ORGANIZED HEALTH CARE EDUCATION/TRAINING PROGRAM

## 2024-12-05 PROCEDURE — 1160F RVW MEDS BY RX/DR IN RCRD: CPT | Mod: CPTII,95,, | Performed by: STUDENT IN AN ORGANIZED HEALTH CARE EDUCATION/TRAINING PROGRAM

## 2024-12-05 RX ORDER — CRANBERRY FRUIT 450 MG
1 TABLET ORAL 3 TIMES DAILY
COMMUNITY

## 2024-12-05 NOTE — TELEPHONE ENCOUNTER
Advised Ms. Awan from [facility] that we have received the faxed paperwork that was sent & it's in Dr. Dominguez's box waiting for her to review it. Ms. Awan VU & noted it in her chart.

## 2024-12-05 NOTE — TELEPHONE ENCOUNTER
----- Message from Rosalia sent at 12/5/2024 11:30 AM CST -----  Contact: pt  Type:  Patient Returning Call    Who Called: pt    Who Left Message for Patient: Marie     Does the patient know what this is regarding?: about the call from genetics lab    Would the patient rather a call back or a response via [facility]?  Call back    Best Call Back Number: [phone]    Additional Information: please call to advise    Thanks      If no answer, please message via portal    Thanks

## 2024-12-05 NOTE — TELEPHONE ENCOUNTER
Pt. Confirmed that office has [facility] forms and when Dr. Dominguez has reviewed them office staff will inform her.  Pt. Vu and confirmed virtual appt. With Dr. Dominguez today.

## 2024-12-05 NOTE — TELEPHONE ENCOUNTER
----- Message from Renay sent at 12/5/2024  9:44 AM CST -----  Contact: [facility]  Type: Needs Medical Advice  Who Called:  [facility]  Best Call Back Number: [phone]  Additional Information: [facility] is calling back to see lab requisition was received stated it was refaxed on 12/03. Can we please check on this and call back to advise status. Thank You.

## 2024-12-05 NOTE — PROGRESS NOTES
Subjective:       Patient ID: Chloe Dolan is a 69 y.o. female.    Chief Complaint: No chief complaint on file.    The patient location is: [place]  The chief complaint leading to consultation is: pain with urination    Visit type: audiovisual    Face to Face time with patient: 18 minutes  25 minutes of total time spent on the encounter, which includes face to face time and non-face to face time preparing to see the patient (eg, review of tests), Obtaining and/or reviewing separately obtained history, Documenting clinical information in the electronic or other health record, Independently interpreting results (not separately reported) and communicating results to the patient/family/caregiver, or Care coordination (not separately reported).         Each patient to whom he or she provides medical services by telemedicine is:  (1) informed of the relationship between the physician and patient and the respective role of any other health care provider with respect to management of the patient; and (2) notified that he or she may decline to receive medical services by telemedicine and may withdraw from such care at any time.    Notes:   69 year old female known to me presents for audiovisual virtual visit for pain with urination. She was last seen in person on 11/5/24 here by Makenna Hernandez NP for acute cystitis; urine culture was positive for greater than 100,000 CFU Klebsiella pneumonia, pansensitive. On 11/15/24 she followed up with me for virtual visit and was prescribed keflex for lingering symptoms. She has just started the medicine intended back from the 17th. It was filled 11/27/24 and she has four pills left, taking one twice a day. She is taking 1.5 glipizide 5 mg daily (total 7.5 mg daily) as well as metformin 1000 mg twice daily. She has switched over to [facility] with [facility] on the [place] since it is advantageous for her since they have transportation and everything under one roof - They have  acupuncture, rheumatology, and cardiology. She has not asked about psychiatry with them, yet. She is going to reschedule mammogram and bone density tests there with them and anticipates her upcoming first appointment soon. Her highest blood sugar stays under 180 when she is not eating candy. She has started Nutrafol vitamins. Her new apartment is in a good area and her neighbor is helping her with her laundry. She is anticipating a doorstep delivery right now. She would like to undergo the genetic testing which was faxed to me. I let her know it looks like there is a $55 copay which she was not aware of. She would like to proceed. I will sign and fax the order and follow up the results. She will let me know if she needs refills or anything between now and when she sees her new provider in January.    Dysuria   This is a recurrent problem. The current episode started 1 to 4 weeks ago. The problem occurs every urination. The problem has been gradually improving. The quality of the pain is described as shooting and stabbing. The pain is at a severity of 4/10. The pain is mild. The maximum temperature recorded prior to her arrival was 103 - 104 F. She is Not sexually active. There is No history of pyelonephritis. Associated symptoms include frequency, urgency and weight loss. Pertinent negatives include no behavior changes, chills, discharge, flank pain, hematuria, hesitancy, nausea, possible pregnancy, sweats, vomiting, constipation, rash or withholding. She has tried antibiotics and increased fluids for the symptoms. The treatment provided mild relief. Her past medical history is significant for catheterization, diabetes mellitus, hypertension and recurrent UTIs. There is no history of diabetes insipidus, genitourinary reflux, kidney stones, a single kidney, STD, urinary stasis or a urological procedure.       Past Medical History:   Diagnosis Date    Acute bacterial sinusitis 07/31/2024    Diabetes mellitus      Diverticular disease 2024    Dyspnea on exertion 10/10/2023    Edema 2023    Heart disease     History of coronary artery bypass graft 2024    Hyperlipidemia     Hypertension     Neuropathy        Past Surgical History:   Procedure Laterality Date    ARTERIAL BYPASS SURGRY      back surgey      CHOLECYSTECTOMY      TOTAL KNEE ARTHROPLASTY      WISDOM TOOTH EXTRACTION         Review of patient's allergies indicates:   Allergen Reactions    Adhesive Itching       Social History     Socioeconomic History    Marital status:    Tobacco Use    Smoking status: Former     Current packs/day: 0.00     Types: Cigarettes     Quit date:      Years since quittin.9    Smokeless tobacco: Never   Substance and Sexual Activity    Alcohol use: No    Drug use: No    Sexual activity: Yes     Partners: Male     Comment:      Social Drivers of Health     Financial Resource Strain: Medium Risk (10/30/2024)    Overall Financial Resource Strain (CARDIA)     Difficulty of Paying Living Expenses: Somewhat hard   Food Insecurity: Food Insecurity Present (10/30/2024)    Hunger Vital Sign     Worried About Running Out of Food in the Last Year: Sometimes true     Ran Out of Food in the Last Year: Sometimes true   Transportation Needs: Unmet Transportation Needs (10/30/2024)    PRAPARE - Transportation     Lack of Transportation (Medical): Yes     Lack of Transportation (Non-Medical): No   Physical Activity: Inactive (10/30/2024)    Exercise Vital Sign     Days of Exercise per Week: 0 days     Minutes of Exercise per Session: 0 min   Stress: Stress Concern Present (10/30/2024)    [employer] - Occupational Stress Questionnaire     Feeling of Stress : Rather much   Housing Stability: Low Risk  (10/30/2024)    Housing Stability Vital Sign     Unable to Pay for Housing in the Last Year: No     Homeless in the Last Year: No       Current Outpatient Medications on File Prior to Visit    Medication Sig Dispense Refill    albuterol (PROVENTIL/VENTOLIN HFA) 90 mcg/actuation inhaler Inhale 2 puffs into the lungs every 4 (four) hours as needed.      ARIPiprazole (ABILIFY) 5 MG Tab Take 1 tablet (5 mg total) by mouth once daily. 30 tablet 1    aspirin (ECOTRIN) 81 MG EC tablet Take 1 tablet (81 mg total) by mouth once daily.      atorvastatin (LIPITOR) 40 MG tablet Take 1 tablet (40 mg total) by mouth every evening. 90 tablet 3    B-complex with vitamin C (Z-BEC OR EQUIV) tablet Take 1 tablet by mouth once daily. (Patient not taking: Reported on 11/7/2024)      blood glucose control, low (TRUE METRIX LEVEL 1) Soln use as directed      blood sugar diagnostic Strp 1 strip 2 (two) times a day.      blood-glucose meter Misc Use daily      calcium-vitamin D3 (OS-JESUS 500 + D3) 500 mg-5 mcg (200 unit) per tablet Take 1 tablet by mouth 2 (two) times daily with meals.      cephALEXin (KEFLEX) 500 MG capsule Take 1 capsule (500 mg total) by mouth every 12 (twelve) hours. 14 capsule 0    cetirizine (ZYRTEC) 10 MG tablet Take 10 mg by mouth once daily.      chlorthalidone (HYGROTEN) 25 MG Tab Take 1 tablet (25 mg total) by mouth once daily. 30 tablet 1    cranberry fruit (CRANBERRY) 450 mg Tab Take 1 tablet by mouth 3 (three) times daily.      empagliflozin (JARDIANCE) 25 mg tablet Take 1 tablet (25 mg total) by mouth once daily. (Patient not taking: Reported on 11/7/2024) 90 tablet 3    fluconazole (DIFLUCAN) 150 MG Tab Take one tablet by mouth one time at end of doxycycline for anticipated medication adverse effect of vaginal candidiasis. If no improvement may repeat in three days. 1 tablet 1    fluticasone propionate (FLONASE) 50 mcg/actuation nasal spray Spray 1 spray (50 mcg total) by Each Nostril route daily as needed for Rhinitis. 16 g 0    folic acid (FOLVITE) 800 MCG Tab Take 800 mcg by mouth once daily.      furosemide (LASIX) 40 MG tablet Take 1 tablet (40 mg total) by mouth daily as needed (leg  swelling). 30 tablet 0    gabapentin (NEURONTIN) 300 MG capsule Take one capsule by mouth in the morning, one capsule by mouth midday, and two capsules in the evening. 540 capsule 1    glipiZIDE (GLUCOTROL) 5 MG tablet Take 1.5 tablets (7.5 mg total) by mouth daily with breakfast. 45 tablet 11    ipratropium (ATROVENT) 21 mcg (0.03 %) nasal spray 2 sprays by Each Nostril route every 12 (twelve) hours.      isosorbide mononitrate (IMDUR) 30 MG 24 hr tablet Take 1 tablet by mouth every morning.      lancets Misc 2 (two) times daily.      loperamide (IMODIUM) 2 mg capsule Take 1 capsule by mouth as needed.      losartan (COZAAR) 50 MG tablet Take 50 mg by mouth once daily.      metFORMIN (GLUCOPHAGE) 1000 MG tablet Take 1 tablet (1,000 mg total) by mouth 2 (two) times daily with meals. 180 tablet 3    metoprolol succinate (TOPROL-XL) 50 MG 24 hr tablet 50 mg 2 (two) times daily.  3    sertraline (ZOLOFT) 100 MG tablet Take 1 tablet (100 mg total) by mouth every evening. 30 tablet 1    sertraline (ZOLOFT) 50 MG tablet Take 1 tablet (50 mg total) by mouth once daily. 30 tablet 0    trospium (SANCTURA) 20 mg Tab tablet Take 1 tablet (20 mg total) by mouth 2 (two) times daily. 60 tablet 11    UNABLE TO FIND Take 1 capsule by mouth once daily. medication name: Nutrafol       No current facility-administered medications on file prior to visit.       Family History   Problem Relation Name Age of Onset    Stroke Mother      Heart attack Mother      Cancer Father      Diabetes Maternal Grandmother      Heart disease Maternal Grandmother      Breast cancer Cousin         Review of Systems   Constitutional:  Positive for weight loss. Negative for chills.   Gastrointestinal:  Negative for constipation, nausea and vomiting.   Genitourinary:  Positive for dysuria, frequency and urgency. Negative for flank pain, hematuria and hesitancy.   Integumentary:  Negative for rash.         Objective:      There were no vitals taken for this  visit.  Physical Exam  No acute distress, at baseline  Assessment:       1. Acute cystitis without hematuria    2. Type 2 diabetes mellitus with diabetic neuropathy, without long-term current use of insulin        Plan:       Acute cystitis without hematuria  - Complete course as prescribed. If symptoms persist will need repeat urinalysis and urine culture.    Type 2 diabetes mellitus with diabetic neuropathy, without long-term current use of insulin  - Continue glipizide 7.5 mg daily and metformin 1000 mg twice daily.  - Next A1c due February 5, 2025 - last 7.4% on 11/5/24.      Anticipates establishing with new primary care on the [place] with [facility] in the beginning of January. Reach out with questions or needs until then.    Visit today included increased complexity associated with the care of the episodic problem acute cystitis addressed and managing the longitudinal care of the patient due to the serious and/or complex managed problem(s) Type 2 diabetes mellitus.

## 2024-12-05 NOTE — TELEPHONE ENCOUNTER
----- Message from Laisha sent at 12/5/2024 10:48 AM CST -----  Type:  Needs Medical Advice    Who Called: mallika      Would the patient rather a call back or a response via [facility]? Call back       Best Call Back Number: [phone]       Additional Information: pt returning call about genetic testing,    Please call back to advise. Thank you.

## 2024-12-17 ENCOUNTER — PATIENT OUTREACH (OUTPATIENT)
Dept: ADMINISTRATIVE | Facility: OTHER | Age: 69
End: 2024-12-17
Payer: MEDICARE

## 2024-12-17 ENCOUNTER — PATIENT MESSAGE (OUTPATIENT)
Dept: PRIMARY CARE CLINIC | Facility: CLINIC | Age: 69
End: 2024-12-17
Payer: MEDICARE

## 2024-12-17 ENCOUNTER — EXTERNAL HOME HEALTH (OUTPATIENT)
Dept: HOME HEALTH SERVICES | Facility: HOSPITAL | Age: 69
End: 2024-12-17
Payer: MEDICARE

## 2024-12-17 NOTE — PROGRESS NOTES
CHW - Case Closure    This LPN spoke to patient via telephone today.   Pt/Caregiver reported: Spoke to patient. She moved to [facility] last month and is doing well. She got a pcp with [facility], she likes the facility and they provide transportation. She denies any additional needs at this time.   Pt/Caregiver denied any additional needs at this time and agrees with episode closure at this time.  Provided patient with Community Health Worker's contact information and encouraged him/her to contact this Community Health Worker if additional needs arise.

## 2024-12-30 ENCOUNTER — EXTERNAL HOME HEALTH (OUTPATIENT)
Dept: HOME HEALTH SERVICES | Facility: HOSPITAL | Age: 69
End: 2024-12-30
Payer: MEDICARE

## 2025-01-03 ENCOUNTER — TELEPHONE (OUTPATIENT)
Dept: FAMILY MEDICINE | Facility: CLINIC | Age: 70
End: 2025-01-03
Payer: MEDICARE

## 2025-01-03 NOTE — TELEPHONE ENCOUNTER
----- Message from Jackelyn sent at 1/3/2025  8:34 AM CST -----  Regarding: Call  Type:  Needs Medical Advice    Who Called: Pt    Would the patient rather a call back or a response via [facility]? Call    Best Call Back Number: [phone]    Additional Information: Pt is requesting a call back for bio metric results. Thanks

## 2025-01-03 NOTE — TELEPHONE ENCOUNTER
----- Message from Laisha sent at 1/3/2025  4:11 PM CST -----  Type:  Needs Medical Advice    Who Called: bri     Would the patient rather a call back or a response via [facility]? Call back       Best Call Back Number: [phone]      Additional Information: bri is trying to see if results were received for pt. Also sent an urgent request for chart notes on pt.      Please call back to advise. Thank you.

## 2025-01-03 NOTE — TELEPHONE ENCOUNTER
Called pt and informed her of her results. Pt states she is at the [facility] and has switched over to [facility] care there.

## 2025-01-06 ENCOUNTER — TELEPHONE (OUTPATIENT)
Dept: FAMILY MEDICINE | Facility: CLINIC | Age: 70
End: 2025-01-06
Payer: MEDICARE

## 2025-01-06 DIAGNOSIS — F33.3 SEVERE EPISODE OF RECURRENT MAJOR DEPRESSIVE DISORDER, WITH PSYCHOTIC FEATURES: ICD-10-CM

## 2025-01-06 DIAGNOSIS — F22 DELUSIONS: ICD-10-CM

## 2025-01-06 DIAGNOSIS — F41.9 ANXIETY: ICD-10-CM

## 2025-01-06 NOTE — TELEPHONE ENCOUNTER
----- Message from Floresita Dominguez DO sent at 1/3/2025  4:43 PM CST -----  Called back and spoke to Manda from [facility] who requests clinic notes showing patient has established care.    Please fax   1) medication list and   2) clinic notes - the last 3-4    To fax number :  [phone]    Thank you.

## 2025-01-07 RX ORDER — SERTRALINE HYDROCHLORIDE 50 MG/1
50 TABLET, FILM COATED ORAL DAILY
Qty: 30 TABLET | Refills: 0 | Status: SHIPPED | OUTPATIENT
Start: 2025-01-07 | End: 2026-01-07

## 2025-01-07 NOTE — TELEPHONE ENCOUNTER
Refill Routing Note   Medication(s) are not appropriate for processing by [facility] for the following reason(s):        Non-participating provider    [facility] action(s):  Route             Appointments  past 12m or future 3m with PCP    Date Provider   Last Visit   12/5/2024 Floresita Dominguez, DO   Next Visit   Visit date not found Floresita Dominguez, DO   ED visits in past 90 days: 0        Note composed:7:55 AM 01/07/2025

## 2025-01-08 ENCOUNTER — DOCUMENT SCAN (OUTPATIENT)
Dept: HOME HEALTH SERVICES | Facility: HOSPITAL | Age: 70
End: 2025-01-08
Payer: MEDICARE

## 2025-01-10 ENCOUNTER — TELEPHONE (OUTPATIENT)
Dept: FAMILY MEDICINE | Facility: CLINIC | Age: 70
End: 2025-01-10
Payer: MEDICARE

## 2025-01-10 NOTE — TELEPHONE ENCOUNTER
5:35 pm:  Unable to lvm for pt. Returning call.    5:38 pm:  unable to lvm returning call to bina.

## 2025-01-10 NOTE — TELEPHONE ENCOUNTER
----- Message from Parul sent at 1/10/2025  3:18 PM CST -----  Contact: bina  Type:  Needs Medical Advice    Who Called: bina    Would the patient rather a call back or a response via [facility]? Call back     Best Call Back Number: [phone]    Additional Information: fax [phone], need chart notes to be faxed over to her office.   Please call back to advise. Thanks!

## 2025-02-07 DIAGNOSIS — F22 DELUSIONS: ICD-10-CM

## 2025-02-07 DIAGNOSIS — I10 PRIMARY HYPERTENSION: ICD-10-CM

## 2025-02-09 RX ORDER — CHLORTHALIDONE 25 MG/1
25 TABLET ORAL DAILY
Qty: 30 TABLET | Refills: 0 | Status: SHIPPED | OUTPATIENT
Start: 2025-02-09

## 2025-02-09 RX ORDER — ARIPIPRAZOLE 5 MG/1
5 TABLET ORAL DAILY
Qty: 30 TABLET | Refills: 0 | Status: SHIPPED | OUTPATIENT
Start: 2025-02-09

## 2025-02-19 DIAGNOSIS — E11.9 TYPE 2 DIABETES MELLITUS WITHOUT COMPLICATION: ICD-10-CM

## 2025-02-26 DIAGNOSIS — E11.9 TYPE 2 DIABETES MELLITUS WITHOUT COMPLICATION: ICD-10-CM

## 2025-02-26 DIAGNOSIS — I50.32 CHRONIC DIASTOLIC HEART FAILURE: Primary | ICD-10-CM

## 2025-03-06 ENCOUNTER — HOSPITAL ENCOUNTER (OUTPATIENT)
Dept: PULMONOLOGY | Facility: CLINIC | Age: 70
Discharge: HOME | End: 2025-03-06
Payer: MEDICARE

## 2025-03-06 DIAGNOSIS — I50.32 CHRONIC DIASTOLIC HEART FAILURE: ICD-10-CM

## 2025-03-06 LAB
DLCO SINGLE BREATH LLN: 16.02
DLCO SINGLE BREATH PRE REF: 36 %
DLCO SINGLE BREATH REF: 21.76
DLCOC SBVA LLN: 2.87
DLCOC SBVA REF: 4.26
DLCOC SINGLE BREATH LLN: 16.02
DLCOC SINGLE BREATH REF: 21.76
DLCOCSBVAULN: 5.65
DLCOCSINGLEBREATHULN: 27.49
DLCOSINGLEBREATHULN: 27.49
DLCOSINGLEBREATHZSCORE: -3.99
DLCOVA LLN: 2.87
DLCOVA PRE REF: 78.2 %
DLCOVA PRE: 3.33 ML/(MIN*MMHG*L) (ref 2.87–5.65)
DLCOVA REF: 4.26
DLCOVAULN: 5.65
ERVN2 LLN: -16449.36
ERVN2 PRE REF: 60.1 %
ERVN2 PRE: 0.39 L (ref -16449.36–16450.64)
ERVN2 REF: 0.64
ERVN2ULN: ABNORMAL
FEF 25 75 LLN: 1.17
FEF 25 75 PRE REF: 85.5 %
FEF 25 75 REF: 2.57
FET100 CHG: -0.3 %
FEV05 LLN: 0.88
FEV05 REF: 1.74
FEV1 CHG: 1.6 %
FEV1 FVC LLN: 64
FEV1 FVC PRE REF: 107.6 %
FEV1 FVC REF: 78
FEV1 LLN: 1.63
FEV1 PRE REF: 71.5 %
FEV1 REF: 2.24
FEV1 VOL CHG: 0.03
FEV1FVCZSCORE: 0.81
FEV1ZSCORE: -1.7
FRCN2 LLN: 1.95
FRCN2 PRE REF: 48.1 %
FRCN2 REF: 2.77
FRCN2ULN: 3.59
FVC CHG: -0.7 %
FVC LLN: 2.11
FVC PRE REF: 65.9 %
FVC REF: 2.91
FVC VOL CHG: -0.01
FVCZSCORE: -2.07
ICN2REF: 2.06
IVC PRE: 1.76 L (ref 2.11–3.75)
IVC SINGLE BREATH LLN: 2.11
IVC SINGLE BREATH PRE REF: 60.6 %
IVC SINGLE BREATH REF: 2.91
IVCSINGLEBREATHULN: 3.75
LLN IC N2: -16447.94
PEF LLN: 3.96
PEF PRE REF: 94 %
PEF REF: 5.73
PHYSICIAN COMMENT: ABNORMAL
POST FEF 25 75: 2.73 L/S (ref 1.17–3.97)
POST FET 100: 6.05 SEC
POST FEV1 FVC: 85.68 % (ref 64.42–89.4)
POST FEV1: 1.63 L (ref 1.63–2.84)
POST FEV5: 1.47 L (ref 0.88–2.59)
POST FVC: 1.9 L (ref 2.11–3.75)
POST PEF: 5.15 L/S (ref 3.96–7.49)
PRE DLCO: 7.84 ML/(MIN*MMHG) (ref 16.02–27.49)
PRE FEF 25 75: 2.2 L/S (ref 1.17–3.97)
PRE FET 100: 6.07 SEC
PRE FEV05 REF: 81.8 %
PRE FEV1 FVC: 83.73 % (ref 64.42–89.4)
PRE FEV1: 1.6 L (ref 1.63–2.84)
PRE FEV5: 1.42 L (ref 0.88–2.59)
PRE FRC N2: 1.33 L (ref 1.95–3.59)
PRE FVC: 1.92 L (ref 2.11–3.75)
PRE IC N2: 1.53 L (ref -16447.94–16452.06)
PRE PEF: 5.38 L/S (ref 3.96–7.49)
PRE REF IC N2: 74 %
RVN2 LLN: 1.55
RVN2 PRE REF: 44.5 %
RVN2 PRE: 0.95 L (ref 1.55–2.7)
RVN2 REF: 2.12
RVN2TLCN2 LLN: 33.51
RVN2TLCN2 PRE REF: 76.6 %
RVN2TLCN2 PRE: 33.03 % (ref 33.51–52.69)
RVN2TLCN2 REF: 43.1
RVN2TLCN2ULN: 52.69
RVN2ULN: 2.7
TLCN2 LLN: 4.12
TLCN2 PRE REF: 56 %
TLCN2 PRE: 2.86 L (ref 4.12–6.09)
TLCN2 REF: 5.11
TLCN2ULN: 6.09
TLCN2ZSCORE: -3.74
ULN IC N2: ABNORMAL
VA PRE: 2.35 L (ref 4.96–4.96)
VA SINGLE BREATH LLN: 4.96
VA SINGLE BREATH PRE REF: 47.4 %
VA SINGLE BREATH REF: 4.96
VASINGLEBREATHULN: 4.96
VCMAXN2 LLN: 2.11
VCMAXN2 PRE REF: 65.9 %
VCMAXN2 PRE: 1.92 L (ref 2.11–3.75)
VCMAXN2 REF: 2.91
VCMAXN2ULN: 3.75

## 2025-03-12 RX ORDER — ALENDRONATE SODIUM 70 MG/1
TABLET ORAL
COMMUNITY
Start: 2025-02-26

## 2025-03-12 NOTE — PROGRESS NOTES
Endocrinology New Visit   03/13/2025  The patient location is: home  Visit type: audiovisual    Face to Face time with patient: 35 minutes  45 minutes of total time spent on the encounter, which includes face to face time and non-face to face time preparing to see the patient (eg, review of tests), Obtaining and/or reviewing separately obtained history, Documenting clinical information in the electronic or other health record, Independently interpreting results (not separately reported) and communicating results to the patient/family/caregiver, or Care coordination (not separately reported).     Each patient to whom he or she provides medical services by telemedicine is:  (1) informed of the relationship between the physician and patient and the respective role of any other health care provider with respect to management of the patient; and (2) notified that he or she may decline to receive medical services by telemedicine and may withdraw from such care at any time.    Subjective:      Chief Complaint:  Diabetes      History of Present Illness  Chloe Dolan is a 69 y.o. female with HTN, psoriatic arthritis, RA, CHF, urinary incontinence, osteoporosis, CAD s/p PCI, GERD, pAFib,  referred by PCP, Dr Floresita Dominguez, for evaluation of T2DM.    She had triple bypass surgery due to widowmaker.   T2DM  Diagnosed at least 10 yrs ago  Known complications: neuropathy (on gabapentin), albuminuria/nephropathy    Current Diabetes Regimen:  Metformin 1000 mg BID      Prior mediations tried:  Glipizide 5 mg with breakfast - this was stopped approx 2wks ago by PCP at [facility] - not sure why, she thinks she did not need it anymore   Jardiance 25 mg daily - stopped this >6mo ago when she ran out and cost was very high  Victoza - stopped >1 yr ago, pcp told her she no longer needed it      Denies personal hx of pancreatitis   Denies personal or Fhx MTC      Diet/Exercise:  Eats   Going through a divorce after 32 yrs of marriage so  "been more stressed lately. Lost 41lbs due to stress. Now she is settled in her own apt.  Eats first meal around 11am usually, and then depending on her brunch or midday meal she may eat a light snack at dinner. Sometimes has snack in the middle of the night - chips or cookies or ice cream - but does not snac overnight every night  Does not drink anything with sugar     Recent Hgb A1C:  Lab Results   Component Value Date    HGBA1C 7.4 (H) 11/05/2024       Glucose Monitoring:  Checks fasting sugar every morning, ranges from 120-170s depending on if she snacked overnight or is eating "like she should"    Hypoglycemic Episodes: rare, but she does feel low when <100    Screening / DM Complications:    Nephropathy:  ACEi/ARB: Taking Losartan 50 mg  Lab Results   Component Value Date    MICALBCREAT 52.2 (H) 11/05/2024       Last Lipid Panel:  Statin: Taking Lipitor 40 mg - never had a heart attack or stroke. Her mother had a massive stroke at age 57 yo  No results found for: "LDLCALC"    Last foot exam : 11/05/2024  Last eye exam Most Recent Eye Exam Date: Not Found; due last week but had to cancel - she will reschedule, no laser surgery or DR but she has had cataract surgery    B12:  No results found for: "[id number]"  7/18/2025:   --B12 1,064 (nl [id number])        For Osteoporosis she is on Fosamax managed by PCP    Hx vertebral fracture    I do not see another DXA in our EMR.  She had a DXA recently done at [facility] ordered byher  PCP who recently started fosamax        ROS:   As above    Objective:     There were no vitals taken for this visit.  BP Readings from Last 3 Encounters:   11/14/24 (!) 117/59   11/07/24 109/63   11/05/24 106/60     Wt Readings from Last 1 Encounters:   11/07/24 0945 75 kg (165 lb 5.5 oz)     There is no height or weight on file to calculate BMI.      Physical Exam  Constitutional:       General: She is not in acute distress.     Appearance: She is not ill-appearing.   HENT:      Head: " "Normocephalic.   Eyes:      Conjunctiva/sclera: Conjunctivae normal.   Pulmonary:      Effort: Pulmonary effort is normal.   Neurological:      Mental Status: She is alert and oriented to person, place, and time.   Psychiatric:         Mood and Affect: Mood normal.         Behavior: Behavior normal.       Lab Review:   Lab Results   Component Value Date    HGBA1C 7.4 (H) 11/05/2024     No results found for: "CHOL", "HDL", "LDLCALC", "TRIG", "CHOLHDL"  Lab Results   Component Value Date     08/27/2024    K 3.5 08/27/2024    CL 98 08/27/2024    CO2 28 08/27/2024     (HH) 08/27/2024    BUN 12 08/27/2024    CREATININE 0.81 08/27/2024    CALCIUM 9.2 08/27/2024    PROT 6.9 08/27/2024    ALBUMIN 3.9 08/27/2024    BILITOT 0.9 08/27/2024    ALKPHOS 70 08/27/2024    AST 36 08/27/2024    ALT 27 08/27/2024    ANIONGAP 12 08/27/2024    ESTGFRAFRICA >60 02/20/2015    EGFRNONAA >60 02/20/2015    TSH 3.001 06/29/2024     No results found for: "[id number]"      All relevant labs and imaging reviewed.    Assessment and Plan     1. Type 2 diabetes mellitus with diabetic neuropathy, without long-term current use of insulin  Assessment & Plan:  T2DM on metformin only with A1c in 11/2024 mid 7s  Given her age, goal A1c slightly above 7 is reasonable, although if we can get her 6.5-7% without lows, that is preferable   Was on VOSS that was recently stopped as well as SGLT2i which was not affordable  Fasting sugars mostly at goal except when she has dietary excursions   We discussed starting mounjaro and slowly up-titrating as tolerated. We reviewed risks/benefits/SE. She has no contraindications  Continue checking fasting sugars and prn if does not fee well  She will reschedule eye exam. UTD on foot exam.  Update A1c, lipids, CMP, and uACR before next visit  DM education to review diet, virtual preferred - she is currently not driving     Orders:  -     tirzepatide (MOUNJARO) 2.5 mg/0.5 mL PnIj; Inject 2.5 mg into the skin " every 7 days.  Dispense: 2 mL; Refill: 5  -     Ambulatory referral/consult to Diabetes Education; Future; Expected date: 03/20/2025  -     metFORMIN (GLUCOPHAGE) 1000 MG tablet; Take 1 tablet (1,000 mg total) by mouth 2 (two) times daily with meals.  Dispense: 180 tablet; Refill: 3  -     Hemoglobin A1C; Future; Expected date: 03/13/2025  -     Lipid Panel; Future; Expected date: 03/13/2025  -     Microalbumin/Creatinine Ratio, Urine; Future; Expected date: 03/13/2025  -     Comprehensive Metabolic Panel; Future; Expected date: 03/13/2025    2. Chronic ischemic heart disease  Assessment & Plan:  Had bypass due to widowmaker in the past  Will target lower LDL goal, <55 given this hx  She is on lipitor 40 mg daily   Update fasting lipids before next visit       3. Osteoporosis without current pathological fracture, unspecified osteoporosis type  Assessment & Plan:  Recently diagnosed by pcp who started fosamax  DXA recently done at [facility] so I cannot review this  Continue osteoporosis management by PCP      4. Psoriatic arthritis  Assessment & Plan:  Manages pain with gabapentin  Not on steroids currently or recently           DM ed (virtual)   RTC 3mo (virtual or in-person) with fasting labs and urine microalbumin before the visit      Kia Michelle MD  [facility]    Visit today included increased complexity associated with the care of the problems addressed and managing the longitudinal care of the patient due to the serious and/or complex managed problems

## 2025-03-13 ENCOUNTER — OFFICE VISIT (OUTPATIENT)
Dept: ENDOCRINOLOGY | Facility: CLINIC | Age: 70
End: 2025-03-13
Payer: MEDICARE

## 2025-03-13 ENCOUNTER — PATIENT MESSAGE (OUTPATIENT)
Dept: DIABETES | Facility: CLINIC | Age: 70
End: 2025-03-13
Payer: MEDICARE

## 2025-03-13 DIAGNOSIS — L40.50 PSORIATIC ARTHRITIS: ICD-10-CM

## 2025-03-13 DIAGNOSIS — I25.9 CHRONIC ISCHEMIC HEART DISEASE: ICD-10-CM

## 2025-03-13 DIAGNOSIS — M81.0 OSTEOPOROSIS WITHOUT CURRENT PATHOLOGICAL FRACTURE, UNSPECIFIED OSTEOPOROSIS TYPE: ICD-10-CM

## 2025-03-13 DIAGNOSIS — E11.40 TYPE 2 DIABETES MELLITUS WITH DIABETIC NEUROPATHY, WITHOUT LONG-TERM CURRENT USE OF INSULIN: Primary | ICD-10-CM

## 2025-03-13 RX ORDER — TIRZEPATIDE 2.5 MG/.5ML
2.5 INJECTION, SOLUTION SUBCUTANEOUS
Qty: 2 ML | Refills: 5 | Status: SHIPPED | OUTPATIENT
Start: 2025-03-13

## 2025-03-13 RX ORDER — METFORMIN HYDROCHLORIDE 1000 MG/1
1000 TABLET ORAL 2 TIMES DAILY WITH MEALS
Qty: 180 TABLET | Refills: 3 | Status: SHIPPED | OUTPATIENT
Start: 2025-03-13 | End: 2026-03-13

## 2025-03-13 NOTE — PATIENT INSTRUCTIONS
"Continue metformin 1000 mg twice daily    Add low dose mounjaro 2.5 mg weekly    Keep checking fasting blood sugar and anytime you don't feel well check your sugar. Our goal is to keep you between . Fasting sugars <140 is reasonable.    Diabetes education visit (virtual is ok)    Follow up with me in 3 months (virtual is ok) with fasting labs and urin protein test before the visit.     Mounjaro start  We will start Mounjaro at 2.5 mg weekly. After 4 weeks if you tolerate it well, we typically increase to 5 mg weekly for improved blood sugar control as well as added weight loss benefit or we can continue 2.5 mg longer to help adjust to the medication. We will increase dose as we are able until we reach the maximum dose of 15 mg or the highest dose that you tolerate. We can increase the dose as often as every month or increase more slowly if needed    The pen is good out of the refrigerator for up to 21 days. Please remove the pen from the refrigerator for 10 minutes before taking injection    Potential Side Effects of Mounjaro:   One of the ways this medication works is by decreasing how fast your stomach empties, which makes you feel full faster after you eat and should help you lose weight. The main side-effects are related to GI upset, such as nausea, bloating and abdominal cramps. You can usually avoid this by eating slowly (take half of your normal portion and eat it over 30 minutes). If you do experience nausea, it does tend to get better after the first few weeks. The most severe reaction is acute pancreatitis which can cause severe abdominal pain radiating to your back. If you experience this, stop the medication and go to the ER. Fortunately this is very rare.      While I do not expect you to have low sugars on metformin and mounjaro, below is information on hoe to treat lows if you do have them for any reason  "15-15 Rule" for Treatment of Hypoglycemia - Low Blood Sugar     What can you do?  Rule of " 15:   Test your blood sugar  If glucose is between 50-70 mg/dL then ingest 15 grams of fast-acting carbs  If glucose is less than 50 mg/dL then ingest 30 grams of fast-acting carbs  Ingest 15 grams of fast-acting carbohydrate - such as:   3-4 glucose tablets  4 oz juice  ½ can regular soda   15 skittles or mini jelly beans   Re-check your blood sugar in 15 minutes. If its less than 70mg/dl, repeat steps 2 and 3.  If your next meal is more than 1 hour away, eat an additional 15 grams of carbohydrate and 1 ounce of protein (examples include crackers with cheese or one-half of a sandwich with peanut butter). It is important not to eat too much because this can raise your blood sugar above the target level.     When treating a low blood glucose, the choice of carbohydrate source is important. Complex carbohydrates, or foods that contain fats along with carbs (like chocolate) can slow the absorption of glucose and should not be used to treat an emergency low     After your blood sugar has normalized, think about why you went low. If you notice a pattern of low blood sugars, contact your Diabetes Team. We may need to adjust your medication.       Once you start using an insulin pen it is good out of the fridge for 28 days

## 2025-03-13 NOTE — ASSESSMENT & PLAN NOTE
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

## 2025-03-13 NOTE — ASSESSMENT & PLAN NOTE
T2DM on metformin only with A1c in 11/2024 mid 7s  Given her age, goal A1c slightly above 7 is reasonable, although if we can get her 6.5-7% without lows, that is preferable   Was on VOSS that was recently stopped as well as SGLT2i which was not affordable  Fasting sugars mostly at goal except when she has dietary excursions   We discussed starting mounjaro and slowly up-titrating as tolerated. We reviewed risks/benefits/SE. She has no contraindications  Continue checking fasting sugars and prn if does not fee well  She will reschedule eye exam. UTD on foot exam.  Update A1c, lipids, CMP, and uACR before next visit  DM education to review diet, virtual preferred - she is currently not driving

## 2025-03-13 NOTE — ASSESSMENT & PLAN NOTE
Recently diagnosed by pcp who started fosamax  DXA recently done at [facility] so I cannot review this  Continue osteoporosis management by PCP

## 2025-03-18 ENCOUNTER — TELEPHONE (OUTPATIENT)
Dept: ENDOCRINOLOGY | Facility: CLINIC | Age: 70
End: 2025-03-18
Payer: MEDICARE

## 2025-03-18 NOTE — TELEPHONE ENCOUNTER
----- Message from Corrina sent at 3/18/2025 11:54 AM CDT -----  Contact: [facility]  CONSULT/ADVISORYName of Caller: [facility] Preference:  [phone] of Call: Pharmacy is calling to notify Dr. Michelle that they do not carry script tirzepatide (MOUNJARO) 2.5 mg/0.5 mL PnIj.  Need to sent somewhere else.  Thank you,

## 2025-03-18 NOTE — TELEPHONE ENCOUNTER
Called pt to ask for an alternate pharmacy because her pharmacy does not carry the mounjaro. Pt stated she would not be taking the mounjaro after she talked to her PCP and she does not want to see [facility]. After further inquiry she stated she just wants to stick with her PCP and the team over there.

## 2025-04-17 ENCOUNTER — OFFICE VISIT (OUTPATIENT)
Dept: OPTOMETRY | Facility: CLINIC | Age: 70
End: 2025-04-17
Payer: COMMERCIAL

## 2025-04-17 DIAGNOSIS — Z01.00 ROUTINE EYE EXAM: Primary | ICD-10-CM

## 2025-04-17 DIAGNOSIS — Z13.5 GLAUCOMA SCREENING: ICD-10-CM

## 2025-04-17 DIAGNOSIS — E11.9 TYPE 2 DIABETES MELLITUS WITHOUT RETINOPATHY: ICD-10-CM

## 2025-04-17 DIAGNOSIS — E11.40 TYPE 2 DIABETES MELLITUS WITH DIABETIC NEUROPATHY, WITHOUT LONG-TERM CURRENT USE OF INSULIN: ICD-10-CM

## 2025-04-17 PROCEDURE — 99999 PR PBB SHADOW E&M-EST. PATIENT-LVL V: CPT | Mod: PBBFAC,,, | Performed by: OPTOMETRIST

## 2025-04-17 PROCEDURE — 92004 COMPRE OPH EXAM NEW PT 1/>: CPT | Mod: S$GLB,,, | Performed by: OPTOMETRIST

## 2025-04-17 NOTE — PROGRESS NOTES
HPI    70 Y/o female is here for diabetic eye exam with no C/o about ocular   health pt wears otc readers +3.00   Pt denies pain and discomfort   No f/f    Eye med: otc at[facility] pr[facility]   Last edited by Yan Collins, OD on 4/17/2025  9:43 AM.            Assessment /Plan     For exam results, see Encounter Report.    Routine eye exam    Type 2 diabetes mellitus with diabetic neuropathy, without long-term current use of insulin  -     Ambulatory referral/consult to Ophthalmology    Type 2 diabetes mellitus without retinopathy    Glaucoma screening      Sp pciol OU--pt happy w otc readers  DM- WITHOUT RETINOPATHY.  Advised yearly DFE  [facility]--advised SYSTANE COMPLETE Ats    PLAN:    Rtc 1 yr

## 2025-05-01 RX ORDER — TROSPIUM CHLORIDE 20 MG/1
20 TABLET, FILM COATED ORAL 2 TIMES DAILY
Qty: 180 TABLET | Refills: 3 | Status: SHIPPED | OUTPATIENT
Start: 2025-05-01

## 2025-08-03 ENCOUNTER — PATIENT MESSAGE (OUTPATIENT)
Dept: ADMINISTRATIVE | Facility: HOSPITAL | Age: 70
End: 2025-08-03
Payer: MEDICARE

## 2025-08-27 DIAGNOSIS — Z78.0 MENOPAUSE: ICD-10-CM
